# Patient Record
Sex: MALE | Race: WHITE | Employment: FULL TIME | URBAN - METROPOLITAN AREA
[De-identification: names, ages, dates, MRNs, and addresses within clinical notes are randomized per-mention and may not be internally consistent; named-entity substitution may affect disease eponyms.]

---

## 2017-02-20 ENCOUNTER — OFFICE VISIT (OUTPATIENT)
Dept: INTERNAL MEDICINE | Facility: CLINIC | Age: 42
End: 2017-02-20
Payer: COMMERCIAL

## 2017-02-20 VITALS
SYSTOLIC BLOOD PRESSURE: 118 MMHG | TEMPERATURE: 98 F | HEIGHT: 72 IN | DIASTOLIC BLOOD PRESSURE: 76 MMHG | HEART RATE: 73 BPM | WEIGHT: 209.4 LBS | BODY MASS INDEX: 28.36 KG/M2

## 2017-02-20 DIAGNOSIS — R10.9 FLANK PAIN: ICD-10-CM

## 2017-02-20 DIAGNOSIS — M54.5 LOW BACK PAIN, UNSPECIFIED BACK PAIN LATERALITY, UNSPECIFIED CHRONICITY, WITH SCIATICA PRESENCE UNSPECIFIED: Primary | ICD-10-CM

## 2017-02-20 PROCEDURE — 99214 OFFICE O/P EST MOD 30 MIN: CPT | Performed by: NURSE PRACTITIONER

## 2017-02-20 NOTE — PROGRESS NOTES
SUBJECTIVE:                                                    Reinier Monge is a 41 year old male who presents to clinic today for the following health issues:      Abdominal Pain      Duration: 1 month    Description (location/character/radiation): L side cramping pain, below rib cage       Associated flank pain: None    Intensity:  mild    Accompanying signs and symptoms:        Fever/Chills: no        Gas/Bloating: no        Nausea/vomitting: no        Diarrhea: no        Dysuria or Hematuria: no     History (previous similar pain/trauma/previous testing): none    Precipitating or alleviating factors:       Pain worse with eating/BM/urination: no       Pain relieved by BM: no, has mild constipation     Therapies tried and outcome: None    LMP:  not applicable     Back Pain      Duration: 1 year        Specific cause: none    Description:   Location of pain: low back bilateral  Character of pain: dull ache, stiffness and waxing and waning  Pain radiation:none  New numbness or weakness in legs, not attributed to pain:  no     Intensity: mild    History:   Pain interferes with job: No. Has long hours sitting at computer  History of back problems: no prior back problems  Any previous MRI or X-rays: None  Sees a specialist for back pain:  No  Therapies tried without relief: none    Alleviating factors:   Improved by: rest and sitting      Precipitating factors:  Worsened by: Standing    Functional and Psychosocial Screen (Jeaneth STarT Back):      Not performed today       Accompanying Signs & Symptoms:  Risk of Fracture:  None  Risk of Cauda Equina:  None  Risk of Infection:  None  Risk of Cancer:  None  Risk of Ankylosing Spondylitis:  Onset at age <35, male, AND morning back stiffness. no                          Patient Active Problem List   Diagnosis     Acute gastritis     Generalized anxiety disorder     Intermittent asthma     CARDIOVASCULAR SCREENING; LDL GOAL LESS THAN 160     Lateral epicondylitis  of right elbow     Past Surgical History   Procedure Laterality Date     C nonspecific procedure Right ~     Meniscus tear       Social History   Substance Use Topics     Smoking status: Never Smoker     Smokeless tobacco: Never Used     Alcohol use No     Family History   Problem Relation Age of Onset     Psychotic Disorder Mother      Born 1951, Anxiety     Family History Negative Father       age 50, from alcholism and smoking and cancer hx      Family History Negative Brother      Born      CANCER Other      nephew testical cancer     Cancer - colorectal No family hx of      Prostate Cancer No family hx of          Current Outpatient Prescriptions   Medication Sig Dispense Refill     LORazepam (ATIVAN) 0.5 MG tablet Take 2 tablets (1 mg) by mouth every 8 hours as needed for anxiety 40 tablet 0     albuterol (PROAIR HFA, PROVENTIL HFA, VENTOLIN HFA) 108 (90 BASE) MCG/ACT inhaler Inhale 2 puffs into the lungs every 4 hours as needed for shortness of breath / dyspnea or wheezing 3 Inhaler 3     IBUPROFEN PO        piroxicam (FELDENE) 20 MG capsule Take 1 capsule (20 mg) by mouth daily (with breakfast) (Patient not taking: Reported on 2017) 30 capsule 1     ORDER FOR DME Equipment being ordered: wrist splint, right (Patient not taking: Reported on 2017) 1 Device 0     BP Readings from Last 3 Encounters:   17 118/76   05/10/16 112/70   08/25/15 116/86    Wt Readings from Last 3 Encounters:   17 209 lb 6.4 oz (95 kg)   16 209 lb (94.8 kg)   05/10/16 209 lb 6.4 oz (95 kg)                    ROS:  Constitutional, HEENT, cardiovascular, pulmonary, gi and gu systems are negative, except as otherwise noted.    OBJECTIVE:                                                    /76 (BP Location: Right arm, Cuff Size: Adult Large)  Pulse 73  Temp 98  F (36.7  C) (Oral)  Ht 6' (1.829 m)  Wt 209 lb 6.4 oz (95 kg)  BMI 28.4 kg/m2  Body mass index is 28.4 kg/(m^2).  GENERAL: healthy, alert  and no distress  RESP: lungs clear to auscultation - no rales, rhonchi or wheezes  CV: regular rate and rhythm, normal S1 S2, no S3 or S4, no murmur, click or rub, no peripheral edema and peripheral pulses strong  ABDOMEN: soft, nontender, no hepatosplenomegaly, no masses and bowel sounds normal  MS: no gross musculoskeletal defects noted, no edema  BACK: no CVA tenderness, there is paralumbar tenderness and muscle knots         ASSESSMENT/PLAN:                                                              ICD-10-CM    1. Low back pain, unspecified back pain laterality, unspecified chronicity, with sciatica presence unspecified M54.5 [facility] PT, HAND, AND CHIROPRACTIC REFERRAL   2. Flank pain R10.9        Patient Instructions   Physical therapy  NSAIDS  miralax daily  Annamarie Wolf, [place]  [facility]

## 2017-02-20 NOTE — MR AVS SNAPSHOT
After Visit Summary   2/20/2017    Reinier Monge    MRN: [id number]           Patient Information     Date Of Birth          1975        Visit Information        Provider Department      2/20/2017 5:00 PM Annamarie Wolf NP [facility]        Today's Diagnoses     Low back pain, unspecified back pain laterality, unspecified chronicity, with sciatica presence unspecified    -  1    Flank pain          Care Instructions    Physical therapy  NSAIDS  miralax daily  Annamarie Wolf CNP          Follow-ups after your visit        Additional Services     [facility] PT, HAND, AND CHIROPRACTIC REFERRAL       **This order will print in the [facility] Scheduling Office**    Physical Therapy, Hand Therapy and Chiropractic Care are available through:    *[facility]  *[facility]  *[facility]    Call one number to schedule at any of the above locations: [phone].    Your provider has referred you to: As Indicated: [facility]    Indication/Reason for Referral: Low Back Pain  Onset of Illness: 1 year  Therapy Orders: Evaluate and Treat  Special Programs:   Special Request:     Jeaneth Eric      Additional Comments for the Therapist or Chiropractor:     Please be aware that coverage of these services is subject to the terms and limitations of your health insurance plan.  Call member services at your health plan with any benefit or coverage questions.      Please bring the following to your appointment:    *Your personal calendar for scheduling future appointments  *Comfortable clothing                  Who to contact     If you have questions or need follow up information about today's clinic visit or your schedule please contact [facility] directly at [phone].  Normal or non-critical lab and imaging results will be communicated to you by MyChart, letter or phone within 4 business days after the clinic has received the  results. If you do not hear from us within 7 days, please contact the clinic through [employer] or phone. If you have a critical or abnormal lab result, we will notify you by phone as soon as possible.  Submit refill requests through [employer] or call your pharmacy and they will forward the refill request to us. Please allow 3 business days for your refill to be completed.          Additional Information About Your Visit        [employer] Information     [employer] gives you secure access to your electronic health record. If you see a primary care provider, you can also send messages to your care team and make appointments. If you have questions, please call your primary care clinic.  If you do not have a primary care provider, please call [phone] and they will assist you.        Care EveryWhere ID     This is your Care EveryWhere ID. This could be used by other organizations to access your [facility] medical records  [id number]        Your Vitals Were     Pulse Temperature Height BMI (Body Mass Index)          73 98  F (36.7  C) (Oral) 6' (1.829 m) 28.4 kg/m2         Blood Pressure from Last 3 Encounters:   02/20/17 118/76   05/10/16 112/70   08/25/15 116/86    Weight from Last 3 Encounters:   02/20/17 209 lb 6.4 oz (95 kg)   05/27/16 209 lb (94.8 kg)   05/10/16 209 lb 6.4 oz (95 kg)              We Performed the Following     [facility] PT, HAND, AND CHIROPRACTIC REFERRAL        Primary Care Provider Office Phone # Fax #    Charlie Thacker [phone]       [facility] [address] [place] [address]        Thank you!     Thank you for choosing [facility]  for your care. Our goal is always to provide you with excellent care. Hearing back from our patients is one way we can continue to improve our services. Please take a few minutes to complete the written survey that you may receive in the mail after your visit with us. Thank you!             Your Updated Medication  List - Protect others around you: Learn how to safely use, store and throw away your medicines at www.disposemymeds.org.          This list is accurate as of: 2/20/17  5:26 PM.  Always use your most recent med list.                   Brand Name Dispense Instructions for use    albuterol 108 (90 BASE) MCG/ACT Inhaler    PROAIR HFA/PROVENTIL HFA/VENTOLIN HFA    3 Inhaler    Inhale 2 puffs into the lungs every 4 hours as needed for shortness of breath / dyspnea or wheezing       IBUPROFEN PO          LORazepam 0.5 MG tablet    ATIVAN    40 tablet    Take 2 tablets (1 mg) by mouth every 8 hours as needed for anxiety       order for DME     1 Device    Equipment being ordered: wrist splint, right       piroxicam 20 MG capsule    FELDENE    30 capsule    Take 1 capsule (20 mg) by mouth daily (with breakfast)

## 2017-02-21 ASSESSMENT — ASTHMA QUESTIONNAIRES: ACT_TOTALSCORE: 22

## 2017-03-13 ENCOUNTER — THERAPY VISIT (OUTPATIENT)
Dept: PHYSICAL THERAPY | Facility: CLINIC | Age: 42
End: 2017-03-13
Payer: COMMERCIAL

## 2017-03-13 DIAGNOSIS — M54.50 CHRONIC BILATERAL LOW BACK PAIN WITHOUT SCIATICA: Primary | ICD-10-CM

## 2017-03-13 DIAGNOSIS — G89.29 CHRONIC BILATERAL LOW BACK PAIN WITHOUT SCIATICA: Primary | ICD-10-CM

## 2017-03-13 PROCEDURE — 97161 PT EVAL LOW COMPLEX 20 MIN: CPT | Mod: GP | Performed by: PHYSICAL THERAPIST

## 2017-03-13 PROCEDURE — 97112 NEUROMUSCULAR REEDUCATION: CPT | Mod: GP | Performed by: PHYSICAL THERAPIST

## 2017-03-13 NOTE — PROGRESS NOTES
[facility] Therapy Initial Evaluation  3/13/2017     Subjective:   Chief Complaint: Intermittent midline low back pain that radiates out to the sides at time.  He denies numbness/tingling or pain spreading to either legs.   Patient's Goal(s): Wants to get back to playing with his son and play softball  Referral Date: 2/20/17  Mechanism of onset: Was standing for 1.5 hours 1 year ago at his son's baseball game when he experienced a low back spasm and could not bend over. Since then he's had episodic pain bilaterally along his lower back.  PMH/surgical history/trauma: none He denies any significant current illness or recent hospital admissions. He denies any regonal implanted devices.  Medications: Lorazepam prn  General health as reported by patient:  good.   Symptom Stability:  Stable/ Uncomplicated  Pain: 0/10 at rest; 6/10 worst  Quality of Pain: Dull with intermitent sharp pain  Better: Sitting down  Worse: Standing, laying down flat  Progression of Symptoms since onset:  Since onset, these symptoms are getting worse  Occupation: Design Job duties: Sitting  Sleeping: Waking 2-3 times per night due to back pain  Current Functional Status:  Standing for 10 minutes, unrestricted sitting with pain increasing over an hour; pain with lifting heavy weights, pain with sexual activity  Previous Functional Status: fully functional prior to pain onset/injury.  Current HEP/exercise regimen: None, Softball end of April 2 nights per week, volleyball  Transportation: Drives  Live with Others: Wife & son (7 years)  Red Flags: Patient denies the following: Pain with Cough / Sneeze / Laughing ; Night Pain ; Fever ; Weakness ; Change in Bowel or Bladder ; Unexplained Weight Loss ;   Patient reports the following: None       HPI  LUMBAR Examination:    Sitting posture: slouched  Standing posture: decreased lordosis  Lateral shift present: none   Posture correction: Painful with sitting erect    Gait:  decreased hip extension and lumbar extension    SL Balance: no trendelenburg pattern noted B    Functional:  - Squat: excessive anterior knee excursion, increased lumbar flexion     AROM: (Major, Moderate, Minimal or Nil loss)  Movement Loss % Loss Pain   Flexion Fingertips to floor Pain at end range   Extension 50% Pain at end range   Side Gliding/Bend L nil Pain on the right   Side Gliding/Bend R nil Pain on the left     Neurological:  Motor Deficit:  Myotomes L R   L1-2 (hip flexion) 5 5   L3 (knee extension) 5 5   L4 (ankle DF) 5 5   L5 (g. toe ext) 5 5   S1 (ankle PF or knee flex) 5 5     Reflexes: Achilles: R 2+, L 2+        Patellar R 2+, L 2+  Dural Signs:   L R   Slump - -   SLR - -     Strength/Motor Control:    - TA activation: fair with cueing   - Glute activation: delayed glute initiation with leg raise B (R>L). Isolated activation of right side with leg raise increased left LBP.    Lumbar Mobility/Spring Testing: Pain with spring testing over lower lumbar segments, increased sensitivity over spinous processes of all lumbar spine    HIP Screen: Decreased hip extension PROM bilaterally (R>L). All other PROM WNL and did not reproduce low back pain; some hip pain with IR and end range flexion movements.     System  Physical Exam  General   ROS    Assessment/Plan:      Patient is a 41 year old male with lumbar complaints.    Patient has the following significant findings with corresponding treatment plan.                Diagnosis 1:  Chronic bilateral low back pain  Pain -  hot/cold therapy, electric stimulation, manual therapy, self management, education, directional preference exercise and home program  Decreased ROM/flexibility - manual therapy and therapeutic exercise  Decreased joint mobility - manual therapy and therapeutic exercise  Decreased strength - therapeutic exercise and therapeutic activities  Impaired muscle performance - neuro re-education  Decreased function - therapeutic  activities  Impaired posture - neuro re-education    Therapy Evaluation Codes:   1) History comprised of:   Personal factors that impact the plan of care:      Time since onset of symptoms.    Comorbidity factors that impact the plan of care are:      Asthma and Pain at night/rest.     Medications impacting care: Lorazepam prn.  2) Examination of Body Systems comprised of:   Body structures and functions that impact the plan of care:      Lumbar spine.   Activity limitations that impact the plan of care are:      Bending, Driving, Sitting, Standing and Sleeping.  3) Clinical presentation characteristics are:   Stable/Uncomplicated.  4) Decision-Making    Low complexity using standardized patient assessment instrument and/or measureable assessment of functional outcome.  Cumulative Therapy Evaluation is: Low complexity.    Previous and current functional limitations:  (See Goal Flow Sheet for this information)    Short term and Long term goals: (See Goal Flow Sheet for this information)     Communication ability:  Patient appears to be able to clearly communicate and understand verbal and written communication and follow directions correctly.  Treatment Explanation - The following has been discussed with the patient:   RX ordered/plan of care  Anticipated outcomes  Possible risks and side effects  This patient would benefit from PT intervention to resume normal activities.   Rehab potential is good.    Frequency:  1 X week, once daily  Duration:  for 8 weeks  Discharge Plan:  Achieve all LTG.  Independent in home treatment program.  Reach maximal therapeutic benefit.    Please refer to the daily flowsheet for treatment today, total treatment time and time spent performing 1:1 timed codes.

## 2017-03-14 PROBLEM — G89.29 CHRONIC BILATERAL LOW BACK PAIN WITHOUT SCIATICA: Status: ACTIVE | Noted: 2017-03-14

## 2017-03-14 PROBLEM — M54.50 CHRONIC BILATERAL LOW BACK PAIN WITHOUT SCIATICA: Status: ACTIVE | Noted: 2017-03-14

## 2017-04-06 ENCOUNTER — OFFICE VISIT (OUTPATIENT)
Dept: INTERNAL MEDICINE | Facility: CLINIC | Age: 42
End: 2017-04-06
Payer: COMMERCIAL

## 2017-04-06 VITALS
OXYGEN SATURATION: 99 % | TEMPERATURE: 97.3 F | BODY MASS INDEX: 28.85 KG/M2 | HEART RATE: 70 BPM | SYSTOLIC BLOOD PRESSURE: 116 MMHG | DIASTOLIC BLOOD PRESSURE: 74 MMHG | HEIGHT: 72 IN | WEIGHT: 213 LBS

## 2017-04-06 DIAGNOSIS — R05.9 COUGH: ICD-10-CM

## 2017-04-06 DIAGNOSIS — R06.2 WHEEZING: ICD-10-CM

## 2017-04-06 DIAGNOSIS — J06.9 UPPER RESPIRATORY TRACT INFECTION, UNSPECIFIED TYPE: Primary | ICD-10-CM

## 2017-04-06 DIAGNOSIS — J45.20 INTERMITTENT ASTHMA, UNCOMPLICATED: ICD-10-CM

## 2017-04-06 PROCEDURE — 99213 OFFICE O/P EST LOW 20 MIN: CPT | Performed by: NURSE PRACTITIONER

## 2017-04-06 RX ORDER — CODEINE PHOSPHATE AND GUAIFENESIN 10; 100 MG/5ML; MG/5ML
2 SOLUTION ORAL EVERY 4 HOURS PRN
Qty: 240 ML | Refills: 1 | Status: SHIPPED | OUTPATIENT
Start: 2017-04-06 | End: 2017-08-08

## 2017-04-06 RX ORDER — AZITHROMYCIN 250 MG/1
TABLET, FILM COATED ORAL
Qty: 6 TABLET | Refills: 1 | Status: SHIPPED | OUTPATIENT
Start: 2017-04-06 | End: 2017-08-08

## 2017-04-06 NOTE — PROGRESS NOTES
.  SUBJECTIVE:                                                    Reinier Monge is a 41 year old male who presents to clinic today for the following health issues:  Pt has a dry cough onset x 4 days.  Has asthma ; using albuterol prn - increased use   No fever   Sore throats today   No ear pain   Some wheezing   Cough not productive     Wife recently diagnosed with bronchitis   Theraflu and nyquil have not helped much         Problem list and histories reviewed & adjusted, as indicated.  Additional history: as documented    Patient Active Problem List   Diagnosis     Acute gastritis     Generalized anxiety disorder     Intermittent asthma     CARDIOVASCULAR SCREENING; LDL GOAL LESS THAN 160     Lateral epicondylitis of right elbow     Chronic bilateral low back pain without sciatica     Past Surgical History:   Procedure Laterality Date     C NONSPECIFIC PROCEDURE Right ~2009    Meniscus tear       Social History   Substance Use Topics     Smoking status: Never Smoker     Smokeless tobacco: Never Used     Alcohol use No     Family History   Problem Relation Age of Onset     Psychotic Disorder Mother      Born [birth year], Anxiety     Family History Negative Father       age 50, from alcholism and smoking and cancer hx      Family History Negative Brother      Born      CANCER Other      nephew testical cancer     Cancer - colorectal No family hx of      Prostate Cancer No family hx of            Reviewed and updated as needed this visit by clinical staff  Tobacco  Allergies  Meds  Med Hx  Surg Hx  Fam Hx  Soc Hx      Reviewed and updated as needed this visit by Provider         ROS:  Constitutional, HEENT, cardiovascular, pulmonary, GI, , musculoskeletal, neuro, skin, endocrine and psych systems are negative, except as otherwise noted.    OBJECTIVE:                                                    /74 (BP Location: Left arm, Patient Position: Chair, Cuff Size: Adult Large)  Pulse 70  Temp  97.3  F (36.3  C) (Oral)  Ht 6' (1.829 m)  Wt 213 lb (96.6 kg)  SpO2 99%  BMI 28.89 kg/m2  Body mass index is 28.89 kg/(m^2).  GENERAL:  alert and no distress  HENT: ear canals and TM's dull, nose and mouth without ulcers or lesions  RESP: lungs clear to auscultation - no rales, rhonchi or wheezes; with forced exhalation wheezes present ; worse left than right side   CV: regular rate and rhythm  ABDOMEN: soft, nontender, no hepatosplenomegaly, no masses and bowel sounds normal  MS: no gross musculoskeletal defects noted, no edema  NEURO: Normal strength and tone, mentation intact and speech normal  PSYCH: mentation appears normal, affect normal/bright    Diagnostic Test Results:  none      ASSESSMENT/PLAN:                                                            (J06.9) Upper respiratory tract infection, unspecified type  (primary encounter diagnosis)  Comment:   Plan: azithromycin (ZITHROMAX) 250 MG tablet            (R05) Cough  Comment:   Plan: azithromycin (ZITHROMAX) 250 MG tablet,         guaiFENesin-codeine (ROBITUSSIN AC) 100-10         MG/5ML SOLN solution            (R06.2) Wheezing  Comment: occasional   Plan: albuterol prn     (J45.20) Intermittent asthma, uncomplicated  Comment: wife is significantly ill with wheezes and on antibiotics   Plan: will treat for illness     Patient Instructions   Will treat with Zithromax 250mg, 2 tabs by mouth day 1, then 1 tab by mouth days 2-5. He is to watch for GI upset, diarrhea or rash.    Cough syrup with codeine as needed for cough      Please, call or return to clinic if signs or symptoms worsen or fail to improve as anticipated        RAGHU Wilcox [facility]

## 2017-04-06 NOTE — PATIENT INSTRUCTIONS
Will treat with Zithromax 250mg, 2 tabs by mouth day 1, then 1 tab by mouth days 2-5. He is to watch for GI upset, diarrhea or rash.    Cough syrup with codeine as needed for cough      Please, call or return to clinic if signs or symptoms worsen or fail to improve as anticipated

## 2017-04-06 NOTE — MR AVS SNAPSHOT
After Visit Summary   4/6/2017    Reinier Monge    MRN: [id number]           Patient Information     Date Of Birth          1975        Visit Information        Provider Department      4/6/2017 8:40 AM Ava Zelaya APRN CNP [facility]        Today's Diagnoses     Upper respiratory tract infection, unspecified type    -  1    Cough        Wheezing        Intermittent asthma, uncomplicated          Care Instructions    Will treat with Zithromax 250mg, 2 tabs by mouth day 1, then 1 tab by mouth days 2-5. He is to watch for GI upset, diarrhea or rash.    Cough syrup with codeine as needed for cough      Please, call or return to clinic if signs or symptoms worsen or fail to improve as anticipated          Follow-ups after your visit        Who to contact     If you have questions or need follow up information about today's clinic visit or your schedule please contact [facility] directly at [phone].  Normal or non-critical lab and imaging results will be communicated to you by MyChart, letter or phone within 4 business days after the clinic has received the results. If you do not hear from us within 7 days, please contact the clinic through [employer] or phone. If you have a critical or abnormal lab result, we will notify you by phone as soon as possible.  Submit refill requests through [employer] or call your pharmacy and they will forward the refill request to us. Please allow 3 business days for your refill to be completed.          Additional Information About Your Visit        MyChart Information     [employer] gives you secure access to your electronic health record. If you see a primary care provider, you can also send messages to your care team and make appointments. If you have questions, please call your primary care clinic.  If you do not have a primary care provider, please call [phone] and they will assist you.        Care EveryWhere ID      This is your Care EveryWhere ID. This could be used by other organizations to access your [place] medical records  [id number]        Your Vitals Were     Pulse Temperature Height Pulse Oximetry BMI (Body Mass Index)       70 97.3  F (36.3  C) (Oral) 6' (1.829 m) 99% 28.89 kg/m2        Blood Pressure from Last 3 Encounters:   04/06/17 116/74   02/20/17 118/76   05/10/16 112/70    Weight from Last 3 Encounters:   04/06/17 213 lb (96.6 kg)   02/20/17 209 lb 6.4 oz (95 kg)   05/27/16 209 lb (94.8 kg)              Today, you had the following     No orders found for display         Today's Medication Changes          These changes are accurate as of: 4/6/17  9:06 AM.  If you have any questions, ask your nurse or doctor.               Start taking these medicines.        Dose/Directions    azithromycin 250 MG tablet   Commonly known as:  ZITHROMAX   Used for:  Upper respiratory tract infection, unspecified type, Cough   Started by:  Ava Zelaya APRN CNP        Two tablets first day, then one tablet daily for four days.   Quantity:  6 tablet   Refills:  1       guaiFENesin-codeine 100-10 MG/5ML Soln solution   Commonly known as:  ROBITUSSIN AC   Used for:  Cough   Started by:  Ava Zelaya APRN CNP        Dose:  2 tsp.   Take 10 mLs by mouth every 4 hours as needed   Quantity:  240 mL   Refills:  1            Where to get your medicines      These medications were sent to [id number] [address] [place] [address] [address] [place] [address]. , [facility] [address]     Phone:  [phone]     azithromycin 250 MG tablet         Some of these will need a paper prescription and others can be bought over the counter.  Ask your nurse if you have questions.     Bring a paper prescription for each of these medications     guaiFENesin-codeine 100-10 MG/5ML Soln solution                Primary Care Provider Office Phone # Fax #    Charlie Thacker [phone]       [facility]  CLINIC [address] [facility] [address]  [facility] [address]        Thank you!     Thank you for choosing [facility]  for your care. Our goal is always to provide you with excellent care. Hearing back from our patients is one way we can continue to improve our services. Please take a few minutes to complete the written survey that you may receive in the mail after your visit with us. Thank you!             Your Updated Medication List - Protect others around you: Learn how to safely use, store and throw away your medicines at www.disposemymeds.org.          This list is accurate as of: 4/6/17  9:06 AM.  Always use your most recent med list.                   Brand Name Dispense Instructions for use    albuterol 108 (90 BASE) MCG/ACT Inhaler    PROAIR HFA/PROVENTIL HFA/VENTOLIN HFA    3 Inhaler    Inhale 2 puffs into the lungs every 4 hours as needed for shortness of breath / dyspnea or wheezing       azithromycin 250 MG tablet    ZITHROMAX    6 tablet    Two tablets first day, then one tablet daily for four days.       guaiFENesin-codeine 100-10 MG/5ML Soln solution    ROBITUSSIN AC    240 mL    Take 10 mLs by mouth every 4 hours as needed       IBUPROFEN PO          LORazepam 0.5 MG tablet    ATIVAN    40 tablet    Take 2 tablets (1 mg) by mouth every 8 hours as needed for anxiety

## 2017-04-06 NOTE — NURSING NOTE
Chief Complaint   Patient presents with     Cough     pt c/o a  dry cough  onset x 4 days.       Initial /74 (BP Location: Left arm, Patient Position: Chair, Cuff Size: Adult Large)  Pulse 70  Temp 97.3  F (36.3  C) (Oral)  Ht 6' (1.829 m)  Wt 213 lb (96.6 kg)  SpO2 99%  BMI 28.89 kg/m2 Estimated body mass index is 28.89 kg/(m^2) as calculated from the following:    Height as of this encounter: 6' (1.829 m).    Weight as of this encounter: 213 lb (96.6 kg).  Medication Reconciliation: complete

## 2017-04-14 ENCOUNTER — MYC MEDICAL ADVICE (OUTPATIENT)
Dept: INTERNAL MEDICINE | Facility: CLINIC | Age: 42
End: 2017-04-14

## 2017-04-14 DIAGNOSIS — R05.9 COUGH: Primary | ICD-10-CM

## 2017-04-17 RX ORDER — BENZONATATE 200 MG/1
200 CAPSULE ORAL 3 TIMES DAILY PRN
Qty: 21 CAPSULE | Refills: 0 | Status: SHIPPED | OUTPATIENT
Start: 2017-04-17 | End: 2017-08-08

## 2017-07-24 DIAGNOSIS — F41.1 GENERALIZED ANXIETY DISORDER: ICD-10-CM

## 2017-07-24 NOTE — TELEPHONE ENCOUNTER
Lorazepam      Last Written Prescription Date:  08/25/16     (SAYS PATIENT NOT TAKING???)  Last Fill Quantity: 40,   # refills: 0  Last Office Visit with [facility], [facility] or [facility] prescribing provider: 04/16/17 Tyrell  Future Office visit:       Routing refill request to provider for review/approval because:  Drug not on the [facility], [facility] or [facility] refill protocol or controlled substance

## 2017-07-25 RX ORDER — LORAZEPAM 0.5 MG/1
1 TABLET ORAL EVERY 8 HOURS PRN
Qty: 40 TABLET | Refills: 0 | Status: SHIPPED | OUTPATIENT
Start: 2017-07-25 | End: 2017-11-02

## 2017-07-27 PROBLEM — G89.29 CHRONIC BILATERAL LOW BACK PAIN WITHOUT SCIATICA: Status: RESOLVED | Noted: 2017-03-14 | Resolved: 2017-07-27

## 2017-07-27 PROBLEM — M54.50 CHRONIC BILATERAL LOW BACK PAIN WITHOUT SCIATICA: Status: RESOLVED | Noted: 2017-03-14 | Resolved: 2017-07-27

## 2017-07-27 NOTE — PROGRESS NOTES
Please see initial evaluation for discharge summary as patient only attended physical therapy one time.

## 2017-08-08 ENCOUNTER — OFFICE VISIT (OUTPATIENT)
Dept: INTERNAL MEDICINE | Facility: CLINIC | Age: 42
End: 2017-08-08
Payer: COMMERCIAL

## 2017-08-08 VITALS
SYSTOLIC BLOOD PRESSURE: 110 MMHG | DIASTOLIC BLOOD PRESSURE: 68 MMHG | WEIGHT: 203 LBS | TEMPERATURE: 98.3 F | OXYGEN SATURATION: 97 % | RESPIRATION RATE: 14 BRPM | HEART RATE: 79 BPM | HEIGHT: 72 IN | BODY MASS INDEX: 27.5 KG/M2

## 2017-08-08 DIAGNOSIS — Z23 NEED FOR TETANUS BOOSTER: ICD-10-CM

## 2017-08-08 DIAGNOSIS — J45.20 INTERMITTENT ASTHMA, UNCOMPLICATED: ICD-10-CM

## 2017-08-08 DIAGNOSIS — F41.9 ANXIETY: Primary | ICD-10-CM

## 2017-08-08 PROCEDURE — 90714 TD VACC NO PRESV 7 YRS+ IM: CPT | Performed by: NURSE PRACTITIONER

## 2017-08-08 PROCEDURE — 99213 OFFICE O/P EST LOW 20 MIN: CPT | Performed by: NURSE PRACTITIONER

## 2017-08-08 RX ORDER — BUPROPION HYDROCHLORIDE 150 MG/1
150 TABLET ORAL EVERY MORNING
Qty: 30 TABLET | Refills: 1 | Status: SHIPPED | OUTPATIENT
Start: 2017-08-08 | End: 2018-04-17

## 2017-08-08 ASSESSMENT — ANXIETY QUESTIONNAIRES
6. BECOMING EASILY ANNOYED OR IRRITABLE: MORE THAN HALF THE DAYS
3. WORRYING TOO MUCH ABOUT DIFFERENT THINGS: NOT AT ALL
5. BEING SO RESTLESS THAT IT IS HARD TO SIT STILL: NOT AT ALL
GAD7 TOTAL SCORE: 9
1. FEELING NERVOUS, ANXIOUS, OR ON EDGE: NEARLY EVERY DAY
7. FEELING AFRAID AS IF SOMETHING AWFUL MIGHT HAPPEN: NOT AT ALL
IF YOU CHECKED OFF ANY PROBLEMS ON THIS QUESTIONNAIRE, HOW DIFFICULT HAVE THESE PROBLEMS MADE IT FOR YOU TO DO YOUR WORK, TAKE CARE OF THINGS AT HOME, OR GET ALONG WITH OTHER PEOPLE: NOT DIFFICULT AT ALL
2. NOT BEING ABLE TO STOP OR CONTROL WORRYING: NEARLY EVERY DAY

## 2017-08-08 ASSESSMENT — PATIENT HEALTH QUESTIONNAIRE - PHQ9: 5. POOR APPETITE OR OVEREATING: SEVERAL DAYS

## 2017-08-08 NOTE — PROGRESS NOTES
SUBJECTIVE:                                                    Reinier Monge is a 41 year old male who presents to clinic today for the following health issues:  Anxiety   No out - when driving there is not an exit it causing him more anxiety   Sitting on edge of seats in movie theater so he can escape     Has used lorazepam 2 pills at a time - takes one and takes another in an hour      Tried   buspar - terrible   celexa   Sertraline   effexor     Has tried pills in past and seemed to heighten it     Discussed and will try Wellbutrin     Tetanus shot          Problem list and histories reviewed & adjusted, as indicated.  Additional history: as documented    Patient Active Problem List   Diagnosis     Acute gastritis     Generalized anxiety disorder     Intermittent asthma     CARDIOVASCULAR SCREENING; LDL GOAL LESS THAN 160     Lateral epicondylitis of right elbow     Past Surgical History:   Procedure Laterality Date     C NONSPECIFIC PROCEDURE Right ~2009    Meniscus tear       Social History   Substance Use Topics     Smoking status: Never Smoker     Smokeless tobacco: Never Used     Alcohol use No     Family History   Problem Relation Age of Onset     Psychotic Disorder Mother      Born , Anxiety     Family History Negative Father       age 50, from alcholism and smoking and cancer hx      Family History Negative Brother      Born      CANCER Other      nephew testical cancer     Cancer - colorectal No family hx of      Prostate Cancer No family hx of              Reviewed and updated as needed this visit by clinical staffTobacco  Allergies  Meds  Med Hx  Surg Hx  Fam Hx  Soc Hx      Reviewed and updated as needed this visit by Provider         ROS:  Constitutional, HEENT, cardiovascular, pulmonary, GI, , musculoskeletal, neuro, skin, endocrine and psych systems are negative, except as otherwise noted.      OBJECTIVE:   /68  Pulse 79  Temp 98.3  F (36.8  C) (Oral)  Resp 14  " Ht 5' 11.75\" (1.822 m)  Wt 203 lb (92.1 kg)  SpO2 97%  BMI 27.72 kg/m2  Body mass index is 27.72 kg/(m^2).  GENERAL: alert and no distress; here with son   RESP: lungs clear to auscultation - no rales, rhonchi or wheezes  CV: regular rate and rhythm, normal S1 S2, no S3 or S4, no murmur, click or rub, no peripheral edema and peripheral pulses strong  PSYCH: mentation appears normal, affect normal/bright    Diagnostic Test Results:  MARCELO-7 SCORE 12/12/2011 9/13/2012 9/19/2014   Total Score 12 1 0           ASSESSMENT/PLAN:             1. Anxiety  Not well controlled - ativan does work well if needed   - buPROPion (WELLBUTRIN XL) 150 MG 24 hr tablet; Take 1 tablet (150 mg) by mouth every morning  Dispense: 30 tablet; Refill: 1    2. Intermittent asthma, uncomplicated  Well controlled   - Asthma Action Plan (AAP)    3. Need for tetanus booster    - TD PRESERV FREE >=7 YRS ADS IM    Patient Instructions   Wellbutrin  mg once daily     Ativan as needed for anxiety attack     Follow up in a month       RAGHU Wilcox [facility]     "

## 2017-08-08 NOTE — MR AVS SNAPSHOT
After Visit Summary   8/8/2017    Reinier Monge    MRN: [id number]           Patient Information     Date Of Birth          1975        Visit Information        Provider Department      8/8/2017 4:00 PM Ava Zelaya APRN CNP [facility]        Today's Diagnoses     Anxiety    -  1    Intermittent asthma, uncomplicated        Need for tetanus booster          Care Instructions    Wellbutrin  mg once daily     Ativan as needed for anxiety attack     Follow up in a month           Follow-ups after your visit        Who to contact     If you have questions or need follow up information about today's clinic visit or your schedule please contact [facility] directly at [phone].  Normal or non-critical lab and imaging results will be communicated to you by MyChart, letter or phone within 4 business days after the clinic has received the results. If you do not hear from us within 7 days, please contact the clinic through [employer] or phone. If you have a critical or abnormal lab result, we will notify you by phone as soon as possible.  Submit refill requests through [employer] or call your pharmacy and they will forward the refill request to us. Please allow 3 business days for your refill to be completed.          Additional Information About Your Visit        MyChart Information     [employer] gives you secure access to your electronic health record. If you see a primary care provider, you can also send messages to your care team and make appointments. If you have questions, please call your primary care clinic.  If you do not have a primary care provider, please call [phone] and they will assist you.        Care EveryWhere ID     This is your Care EveryWhere ID. This could be used by other organizations to access your [place] medical records  [id number]         Blood Pressure from Last 3 Encounters:   04/06/17 116/74   02/20/17 118/76    05/10/16 112/70    Weight from Last 3 Encounters:   04/06/17 213 lb (96.6 kg)   02/20/17 209 lb 6.4 oz (95 kg)   05/27/16 209 lb (94.8 kg)              We Performed the Following     Asthma Action Plan (AAP)     TD PRESERV FREE >=7 YRS ADS IM          Today's Medication Changes          These changes are accurate as of: 8/8/17  4:37 PM.  If you have any questions, ask your nurse or doctor.               Start taking these medicines.        Dose/Directions    buPROPion 150 MG 24 hr tablet   Commonly known as:  WELLBUTRIN XL   Used for:  Anxiety   Started by:  Ava Zelaya APRN CNP        Dose:  150 mg   Take 1 tablet (150 mg) by mouth every morning   Quantity:  30 tablet   Refills:  1         Stop taking these medicines if you haven't already. Please contact your care team if you have questions.     azithromycin 250 MG tablet   Commonly known as:  ZITHROMAX   Stopped by:  Ava Zelaya APRN CNP           benzonatate 200 MG capsule   Commonly known as:  TESSALON   Stopped by:  Ava Zelaya APRN CNP           guaiFENesin-codeine 100-10 MG/5ML Soln solution   Commonly known as:  ROBITUSSIN AC   Stopped by:  Ava Zelaya APRN CNP                Where to get your medicines      These medications were sent to [facility][id number] - [place], [place] - [address]  [address], [facility] [address]     Phone:  [phone]     buPROPion 150 MG 24 hr tablet                Primary Care Provider Office Phone # Fax #    Charlie Thacker [phone]       [address] [facility] [address]  [facility] [address]        Equal Access to Services     [facility]: Hadii alpa Dominguez, pat lupb, qaybta kaalmacecile frausto. So [facility] [phone].    ATENCIÓN: Si habla español, tiene a quiroz disposición servicios gratuitos de asistencia lingüística. Llame al [phone].    We comply with applicable federal civil rights laws and  [place] laws. We do not discriminate on the basis of race, color, national origin, age, disability sex, sexual orientation or gender identity.            Thank you!     Thank you for choosing [facility]  for your care. Our goal is always to provide you with excellent care. Hearing back from our patients is one way we can continue to improve our services. Please take a few minutes to complete the written survey that you may receive in the mail after your visit with us. Thank you!             Your Updated Medication List - Protect others around you: Learn how to safely use, store and throw away your medicines at www.disposemymeds.org.          This list is accurate as of: 8/8/17  4:37 PM.  Always use your most recent med list.                   Brand Name Dispense Instructions for use Diagnosis    albuterol 108 (90 BASE) MCG/ACT Inhaler    PROAIR HFA/PROVENTIL HFA/VENTOLIN HFA    3 Inhaler    Inhale 2 puffs into the lungs every 4 hours as needed for shortness of breath / dyspnea or wheezing    Intermittent asthma, uncomplicated       buPROPion 150 MG 24 hr tablet    WELLBUTRIN XL    30 tablet    Take 1 tablet (150 mg) by mouth every morning    Anxiety       IBUPROFEN PO           LORazepam 0.5 MG tablet    ATIVAN    40 tablet    Take 2 tablets (1 mg) by mouth every 8 hours as needed for anxiety    Generalized anxiety disorder

## 2017-08-08 NOTE — NURSING NOTE
Chief Complaint   Patient presents with     Anxiety       Initial There were no vitals taken for this visit. Estimated body mass index is 28.89 kg/(m^2) as calculated from the following:    Height as of 4/6/17: 6' (1.829 m).    Weight as of 4/6/17: 213 lb (96.6 kg).  Medication Reconciliation: complete

## 2017-08-08 NOTE — LETTER
My Asthma Action Plan  Name: Reinier Monge   YOB: 1975  Date: 8/8/2017   My doctor: RAGHU Wilcox CNP   My clinic: [facility]        My Control Medicine:   My Rescue Medicine: albuterol   My Asthma Severity: mild  Avoid your asthma triggers:                GREEN ZONE   Good Control    I feel good    No cough or wheeze    Can work, sleep and play without asthma symptoms       Take your asthma control medicine every day.     1. If exercise triggers your asthma, take your rescue medication    15 minutes before exercise or sports, and    During exercise if you have asthma symptoms  2. Spacer to use with inhaler: If you have a spacer, make sure to use it with your inhaler             YELLOW ZONE Getting Worse  I have ANY of these:    I do not feel good    Cough or wheeze    Chest feels tight    Wake up at night   1. Keep taking your Green Zone medications  2. Start taking your rescue medicine:    every 20 minutes for up to 1 hour. Then every 4 hours for 24-48 hours.  3. If you stay in the Yellow Zone for more than 12-24 hours, contact your doctor.  4. If you do not return to the Green Zone in 12-24 hours or you get worse, start taking your oral steroid medicine if prescribed by your provider.           RED ZONE Medical Alert - Get Help  I have ANY of these:    I feel awful    Medicine is not helping    Breathing getting harder    Trouble walking or talking    Nose opens wide to breathe       1. Take your rescue medicine NOW  2. If your provider has prescribed an oral steroid medicine, start taking it NOW  3. Call your doctor NOW  4. If you are still in the Red Zone after 20 minutes and you have not reached your doctor:    Take your rescue medicine again and    Call 911 or go to the emergency room right away    See your regular doctor within 2 weeks of an Emergency Room or Urgent Care visit for follow-up treatment.        Electronically signed by: Ava Zelaya  August 8, 2017    Annual Reminders:  Meet with Asthma Educator,  Flu Shot in the Fall, consider Pneumonia Vaccination for patients with asthma (aged 19 and older).    Pharmacy: [id number] [address] [facility] [address]                    Asthma Triggers  How To Control Things That Make Your Asthma Worse    Triggers are things that make your asthma worse.  Look at the list below to help you find your triggers and what you can do about them.  You can help prevent asthma flare-ups by staying away from your triggers.      Trigger                                                          What you can do   Cigarette Smoke  Tobacco smoke can make asthma worse. Do not allow smoking in your home, car or around you.  Be sure no one smokes at a child s day care or school.  If you smoke, ask your health care provider for ways to help you quit.  Ask family members to quit too.  Ask your health care provider for a referral to Quit Plan to help you quit smoking, or call 3-617-716-PLAN.     Colds, Flu, Bronchitis  These are common triggers of asthma. Wash your hands often.  Don t touch your eyes, nose or mouth.  Get a flu shot every year.     Dust Mites  These are tiny bugs that live in cloth or carpet. They are too small to see. Wash sheets and blankets in hot water every week.   Encase pillows and mattress in dust mite proof covers.  Avoid having carpet if you can. If you have carpet, vacuum weekly.   Use a dust mask and HEPA vacuum.   Pollen and Outdoor Mold  Some people are allergic to trees, grass, or weed pollen, or molds. Try to keep your windows closed.  Limit time out doors when pollen count is high.   Ask you health care provider about taking medicine during allergy season.     Animal Dander  Some people are allergic to skin flakes, urine or saliva from pets with fur or feathers. Keep pets with fur or feathers out of your home.    If you can t keep the pet outdoors, then keep the pet out of your bedroom.   Keep the bedroom door closed.  Keep pets off cloth furniture and away from stuffed toys.     Mice, Rats, and Cockroaches  Some people are allergic to the waste from these pests.   Cover food and garbage.  Clean up spills and food crumbs.  Store grease in the refrigerator.   Keep food out of the bedroom.   Indoor Mold  This can be a trigger if your home has high moisture. Fix leaking faucets, pipes, or other sources of water.   Clean moldy surfaces.  Dehumidify basement if it is damp and smelly.   Smoke, Strong Odors, and Sprays  These can reduce air quality. Stay away from strong odors and sprays, such as perfume, powder, hair spray, paints, smoke incense, paint, cleaning products, candles and new carpet.   Exercise or Sports  Some people with asthma have this trigger. Be active!  Ask your doctor about taking medicine before sports or exercise to prevent symptoms.    Warm up for 5-10 minutes before and after sports or exercise.     Other Triggers of Asthma  Cold air:  Cover your nose and mouth with a scarf.  Sometimes laughing or crying can be a trigger.  Some medicines and food can trigger asthma.

## 2017-08-09 ASSESSMENT — ANXIETY QUESTIONNAIRES: GAD7 TOTAL SCORE: 9

## 2017-08-09 ASSESSMENT — ASTHMA QUESTIONNAIRES: ACT_TOTALSCORE: 23

## 2017-10-05 DIAGNOSIS — J45.20 INTERMITTENT ASTHMA, UNCOMPLICATED: ICD-10-CM

## 2017-10-05 NOTE — TELEPHONE ENCOUNTER
albuterol (PROAIR HFA, PROVENTIL HFA, VENTOLIN HFA) 108 (90 BASE) MCG/ACT inhaler       Last Written Prescription Date: 05/10/16  Last Fill Quantity: 3inhalers, # refills: 3   Last Office Visit with G, P or [facility] prescribing provider:  08/08/17   Future Office Visit:       Date of Last Asthma Action Plan Letter:   Asthma Action Plan Q1 Year    Topic Date Due     Asthma Action Plan - yearly  08/08/2018      Asthma Control Test:   ACT Total Scores 8/8/2017   ACT TOTAL SCORE -   ASTHMA ER VISITS -   ASTHMA HOSPITALIZATIONS -   ACT TOTAL SCORE (Goal Greater than or Equal to 20) 23   In the past 12 months, how many times did you visit the emergency room for your asthma without being admitted to the hospital? 0   In the past 12 months, how many times were you hospitalized overnight because of your asthma? 0       Date of Last Spirometry Test:   No results found for this or any previous visit.

## 2017-10-06 NOTE — TELEPHONE ENCOUNTER
Per 08/08 OV note: f/u in 1 month.    Called pt, left detailed vm (per consent to communicate) stating due for appt d/t anxiety medication. Asked pt to call with update if he's still taking the medication.

## 2017-10-20 RX ORDER — ALBUTEROL SULFATE 90 UG/1
AEROSOL, METERED RESPIRATORY (INHALATION)
Qty: 18 G | Refills: 0 | Status: SHIPPED | OUTPATIENT
Start: 2017-10-20 | End: 2017-11-02

## 2017-10-20 NOTE — TELEPHONE ENCOUNTER
Pt's wife Rachael left voice message on 10/19/17 at 1734. Pt has been trying to get refill for a few weeks, she request we call her or pt with update if there is an issue getting this filled.     Contacted Rachael - consent to communicate on file.   She states pt took anxiety medication for a few weeks, but then forgot and hasn't restarted it because he didn't notice any difference while on the medication. Advised her to have pt schedule follow up appt to discuss other options for anxiety symptoms. She agrees, appt scheduled with Ava.    Next 5 appointments (look out 90 days)     Nov 02, 2017  5:20 PM CDT   [facility] with RAGHU Wilcox CNP   [facility]    [address]  [facility] [address] [phone]                 Medication is being filled for 1 time refill only due to:  Patient needs to be seen because for follow up. Future appt scheduled.

## 2017-11-02 ENCOUNTER — OFFICE VISIT (OUTPATIENT)
Dept: INTERNAL MEDICINE | Facility: CLINIC | Age: 42
End: 2017-11-02
Payer: COMMERCIAL

## 2017-11-02 VITALS
OXYGEN SATURATION: 97 % | SYSTOLIC BLOOD PRESSURE: 104 MMHG | HEART RATE: 88 BPM | BODY MASS INDEX: 28.17 KG/M2 | HEIGHT: 72 IN | WEIGHT: 208 LBS | DIASTOLIC BLOOD PRESSURE: 62 MMHG | TEMPERATURE: 98.4 F

## 2017-11-02 DIAGNOSIS — J45.20 INTERMITTENT ASTHMA, UNCOMPLICATED: ICD-10-CM

## 2017-11-02 DIAGNOSIS — F41.1 GENERALIZED ANXIETY DISORDER: ICD-10-CM

## 2017-11-02 DIAGNOSIS — Z00.00 ROUTINE ADULT HEALTH MAINTENANCE: Primary | ICD-10-CM

## 2017-11-02 PROCEDURE — 99213 OFFICE O/P EST LOW 20 MIN: CPT | Mod: 25 | Performed by: NURSE PRACTITIONER

## 2017-11-02 PROCEDURE — 90686 IIV4 VACC NO PRSV 0.5 ML IM: CPT | Performed by: NURSE PRACTITIONER

## 2017-11-02 PROCEDURE — 90471 IMMUNIZATION ADMIN: CPT | Performed by: NURSE PRACTITIONER

## 2017-11-02 RX ORDER — ALBUTEROL SULFATE 90 UG/1
2 AEROSOL, METERED RESPIRATORY (INHALATION) EVERY 4 HOURS PRN
Qty: 18 G | Refills: 6 | Status: SHIPPED | OUTPATIENT
Start: 2017-11-02 | End: 2019-06-11

## 2017-11-02 RX ORDER — LORAZEPAM 0.5 MG/1
1 TABLET ORAL EVERY 8 HOURS PRN
Qty: 40 TABLET | Refills: 0 | Status: SHIPPED | OUTPATIENT
Start: 2017-11-02 | End: 2018-05-29

## 2017-11-02 ASSESSMENT — ANXIETY QUESTIONNAIRES
6. BECOMING EASILY ANNOYED OR IRRITABLE: SEVERAL DAYS
5. BEING SO RESTLESS THAT IT IS HARD TO SIT STILL: NOT AT ALL
GAD7 TOTAL SCORE: 3
GAD7 TOTAL SCORE: 3
3. WORRYING TOO MUCH ABOUT DIFFERENT THINGS: NOT AT ALL
GAD7 TOTAL SCORE: 3
7. FEELING AFRAID AS IF SOMETHING AWFUL MIGHT HAPPEN: NOT AT ALL
4. TROUBLE RELAXING: NOT AT ALL
1. FEELING NERVOUS, ANXIOUS, OR ON EDGE: MORE THAN HALF THE DAYS
7. FEELING AFRAID AS IF SOMETHING AWFUL MIGHT HAPPEN: NOT AT ALL
2. NOT BEING ABLE TO STOP OR CONTROL WORRYING: NOT AT ALL

## 2017-11-02 NOTE — MR AVS SNAPSHOT
After Visit Summary   11/2/2017    Reinier Monge    MRN: [id number]           Patient Information     Date Of Birth          1975        Visit Information        Provider Department      11/2/2017 5:20 PM Ava Zelaya APRN CNP [facility]        Today's Diagnoses     Routine adult health maintenance    -  1    Generalized anxiety disorder        Intermittent asthma, uncomplicated          Care Instructions    Consider fasting lab in a year     Let me know if you start wellbutrin           Follow-ups after your visit        Who to contact     If you have questions or need follow up information about today's clinic visit or your schedule please contact [facility] directly at [phone].  Normal or non-critical lab and imaging results will be communicated to you by MyChart, letter or phone within 4 business days after the clinic has received the results. If you do not hear from us within 7 days, please contact the clinic through Scanaduhart or phone. If you have a critical or abnormal lab result, we will notify you by phone as soon as possible.  Submit refill requests through Virtuix or call your pharmacy and they will forward the refill request to us. Please allow 3 business days for your refill to be completed.          Additional Information About Your Visit        MyChart Information     Virtuix gives you secure access to your electronic health record. If you see a primary care provider, you can also send messages to your care team and make appointments. If you have questions, please call your primary care clinic.  If you do not have a primary care provider, please call [phone] and they will assist you.        Care EveryWhere ID     This is your Care EveryWhere ID. This could be used by other organizations to access your [place] medical records  [id number]        Your Vitals Were     Pulse Temperature Height Pulse Oximetry BMI (Body Mass  "Index)       88 98.4  F (36.9  C) (Oral) 5' 11.75\" (1.822 m) 97% 28.41 kg/m2        Blood Pressure from Last 3 Encounters:   11/02/17 104/62   08/08/17 110/68   04/06/17 116/74    Weight from Last 3 Encounters:   11/02/17 208 lb (94.3 kg)   08/08/17 203 lb (92.1 kg)   04/06/17 213 lb (96.6 kg)              We Performed the Following     HC FLU VAC PRESRV FREE QUAD SPLIT VIR 3+YRS IM          Today's Medication Changes          These changes are accurate as of: 11/2/17  5:44 PM.  If you have any questions, ask your nurse or doctor.               These medicines have changed or have updated prescriptions.        Dose/Directions    albuterol 108 (90 BASE) MCG/ACT Inhaler   Commonly known as:  VENTOLIN HFA   This may have changed:  See the new instructions.   Used for:  Intermittent asthma, uncomplicated   Changed by:  Ava Zelaya APRN CNP        Dose:  2 puff   Inhale 2 puffs into the lungs every 4 hours as needed for shortness of breath / dyspnea or wheezing   Quantity:  18 g   Refills:  6            Where to get your medicines      These medications were sent to [id number] [place], [place] - [address] [place] [address] [address] [place] [address]. , [place] [id number]     Phone:  [phone]     albuterol 108 (90 BASE) MCG/ACT Inhaler         Some of these will need a paper prescription and others can be bought over the counter.  Ask your nurse if you have questions.     Bring a paper prescription for each of these medications     LORazepam 0.5 MG tablet                Primary Care Provider Office Phone # Fax #    Charlie Thacker [phone]       [address] [facility] [address]  [facility] [address]        Equal Access to Services     LOLLY MATHEW : Aba Dominguez, pat rizo, qaalexta kaalmaanand au, cecile de jesus. So [facility] [phone].    ATENCIÓN: Si habla español, tiene a quiroz disposición servicios gratuitos de asistencia lingüística. Llame al " [phone].    We comply with applicable federal civil rights laws and [place] laws. We do not discriminate on the basis of race, color, national origin, age, disability, sex, sexual orientation, or gender identity.            Thank you!     Thank you for choosing [facility]  for your care. Our goal is always to provide you with excellent care. Hearing back from our patients is one way we can continue to improve our services. Please take a few minutes to complete the written survey that you may receive in the mail after your visit with us. Thank you!             Your Updated Medication List - Protect others around you: Learn how to safely use, store and throw away your medicines at www.disposemymeds.org.          This list is accurate as of: 11/2/17  5:44 PM.  Always use your most recent med list.                   Brand Name Dispense Instructions for use Diagnosis    albuterol 108 (90 BASE) MCG/ACT Inhaler    VENTOLIN HFA    18 g    Inhale 2 puffs into the lungs every 4 hours as needed for shortness of breath / dyspnea or wheezing    Intermittent asthma, uncomplicated       buPROPion 150 MG 24 hr tablet    WELLBUTRIN XL    30 tablet    Take 1 tablet (150 mg) by mouth every morning    Anxiety       IBUPROFEN PO           LORazepam 0.5 MG tablet    ATIVAN    40 tablet    Take 2 tablets (1 mg) by mouth every 8 hours as needed for anxiety    Generalized anxiety disorder

## 2017-11-02 NOTE — NURSING NOTE
"Chief Complaint   Patient presents with     Recheck Medication       Initial /62 (BP Location: Left arm, Patient Position: Sitting, Cuff Size: Adult Large)  Pulse 88  Temp 98.4  F (36.9  C) (Oral)  Ht 5' 11.75\" (1.822 m)  Wt 208 lb (94.3 kg)  SpO2 97%  BMI 28.41 kg/m2 Estimated body mass index is 28.41 kg/(m^2) as calculated from the following:    Height as of this encounter: 5' 11.75\" (1.822 m).    Weight as of this encounter: 208 lb (94.3 kg).  Medication Reconciliation: complete    "

## 2017-11-02 NOTE — NURSING NOTE
"Injectable Influenza Immunization Documentation    1.  Has the patient received the information for the injectable influenza vaccine? YES     2. Is the patient 6 months of age or older? YES     3. Does the patient have any of the following contraindications?         Severe allergy to eggs? No     Severe allergic reaction to previous influenza vaccines? No   Severe allergy to latex? No       History of Guillain-Stratton syndrome? No     Currently have a temperature greater than 100.4F? No        4.  Severely egg allergic patients should have flu vaccine eligibility assessed by an MD, RN, or pharmacist, and those who received flu vaccine should be observed for 15 min by an MD, RN, Pharmacist, Medical Technician, or member of clinic staff.\": YES    5. Latex-allergic patients should be given latex-free influenza vaccine Yes. Please reference the Vaccine latex table to determine if your clinic s product is latex-containing.       Vaccination given by .SALIMA UREÑA LPN          "

## 2017-11-02 NOTE — PROGRESS NOTES
".  SUBJECTIVE:   Reinier Monge is a 42 year old male who presents to clinic today for the following health issues:  Answers for HPI/ROS submitted by the patient on 2017   MARCELO 7 TOTAL SCORE: 3    Med recheck    Not taking wellbutrin for a while   Took and got a cold and he stopped taking it   Not using ativan much - occasionally     Work checks cholesterol yearly and has been ok  HDL has been a little low               Problem list and histories reviewed & adjusted, as indicated.  Additional history: as documented    Patient Active Problem List   Diagnosis     Acute gastritis     Generalized anxiety disorder     Intermittent asthma     CARDIOVASCULAR SCREENING; LDL GOAL LESS THAN 160     Lateral epicondylitis of right elbow     Past Surgical History:   Procedure Laterality Date     C NONSPECIFIC PROCEDURE Right ~2009    Meniscus tear       Social History   Substance Use Topics     Smoking status: Never Smoker     Smokeless tobacco: Never Used     Alcohol use No     Family History   Problem Relation Age of Onset     Psychotic Disorder Mother      Born , Anxiety     Family History Negative Father       age 50, from alcholism and smoking and cancer hx      Family History Negative Brother      Born      CANCER Other      nephew testical cancer     Cancer - colorectal No family hx of      Prostate Cancer No family hx of              Reviewed and updated as needed this visit by clinical staffTobacco  Allergies  Meds  Med Hx  Surg Hx  Fam Hx  Soc Hx      Reviewed and updated as needed this visit by Provider  Allergies         ROS:  Constitutional, HEENT, cardiovascular, pulmonary, GI, , musculoskeletal, neuro, skin, endocrine and psych systems are negative, except as otherwise noted.      OBJECTIVE:   /62 (BP Location: Left arm, Patient Position: Sitting, Cuff Size: Adult Large)  Pulse 88  Temp 98.4  F (36.9  C) (Oral)  Ht 5' 11.75\" (1.822 m)  Wt 208 lb (94.3 kg)  SpO2 97%  BMI " 28.41 kg/m2  Body mass index is 28.41 kg/(m^2).  GENERAL: alert and no distress  RESP: lungs clear to auscultation - no rales, rhonchi or wheezes  CV: regular rate and rhythm, normal S1 S2, no S3 or S4, no murmur, click or rub, no peripheral edema  ABDOMEN: soft, nontenderand bowel sounds normal  MS: no gross musculoskeletal defects noted, no edema  NEURO: Normal strength and tone, mentation intact and speech normal  PSYCH: mentation appears normal, affect normal/bright    Diagnostic Test Results:  none     ASSESSMENT/PLAN:             1. Routine adult health maintenance    - HC FLU VAC PRESRV FREE QUAD SPLIT VIR 3+YRS IM    2. Generalized anxiety disorder  In good control- ativan sparingly needed   Driving is worse trigger but doing ok   Did not take wellbutrin ongoing as he forgot- will let me know if he wants to restart medication trial    - LORazepam (ATIVAN) 0.5 MG tablet; Take 2 tablets (1 mg) by mouth every 8 hours as needed for anxiety  Dispense: 40 tablet; Refill: 0    3. Intermittent asthma, uncomplicated  Well controlled - needs refill  - albuterol (VENTOLIN HFA) 108 (90 BASE) MCG/ACT Inhaler; Inhale 2 puffs into the lungs every 4 hours as needed for shortness of breath / dyspnea or wheezing  Dispense: 18 g; Refill: 6    Patient Instructions   Consider fasting lab in a year     Let me know if you start wellbutrin       RAGHU Wilcox [facility]

## 2017-11-03 ASSESSMENT — ANXIETY QUESTIONNAIRES: GAD7 TOTAL SCORE: 3

## 2018-04-17 ENCOUNTER — OFFICE VISIT (OUTPATIENT)
Dept: INTERNAL MEDICINE | Facility: CLINIC | Age: 43
End: 2018-04-17
Payer: COMMERCIAL

## 2018-04-17 VITALS
SYSTOLIC BLOOD PRESSURE: 110 MMHG | WEIGHT: 219.9 LBS | OXYGEN SATURATION: 97 % | TEMPERATURE: 98.7 F | BODY MASS INDEX: 29.78 KG/M2 | HEIGHT: 72 IN | DIASTOLIC BLOOD PRESSURE: 80 MMHG | HEART RATE: 88 BPM

## 2018-04-17 DIAGNOSIS — M67.911 DISORDER OF RIGHT ROTATOR CUFF: ICD-10-CM

## 2018-04-17 DIAGNOSIS — M25.511 ACUTE PAIN OF RIGHT SHOULDER: Primary | ICD-10-CM

## 2018-04-17 PROCEDURE — 99213 OFFICE O/P EST LOW 20 MIN: CPT | Performed by: NURSE PRACTITIONER

## 2018-04-17 NOTE — NURSING NOTE
"Chief Complaint   Patient presents with     Shoulder right       Initial /80 (Cuff Size: Adult Large)  Pulse 88  Temp 98.7  F (37.1  C) (Oral)  Ht 5' 11.75\" (1.822 m)  Wt 219 lb 14.4 oz (99.7 kg)  SpO2 97%  BMI 30.03 kg/m2 Estimated body mass index is 30.03 kg/(m^2) as calculated from the following:    Height as of this encounter: 5' 11.75\" (1.822 m).    Weight as of this encounter: 219 lb 14.4 oz (99.7 kg).  Medication Reconciliation: complete    "

## 2018-04-17 NOTE — MR AVS SNAPSHOT
After Visit Summary   4/17/2018    Reinier Monge    MRN: [id number]           Patient Information     Date Of Birth          1975        Visit Information        Provider Department      4/17/2018 3:40 PM Ava eZlaya APRN [facility]        Today's Diagnoses     Acute pain of right shoulder    -  1      Care Instructions    May ice shoulder     Ibuprofen 600-800 mg every 8 hours if needed for pain    May use sling for comfort      April 19, 2018 at 340 pm   Dr Beltran   [address] [facility]  [address]          Follow-ups after your visit        Additional Services     ORTHO  REFERRAL       [facility] is referring you to the Orthopedic  Services at [facility] and Orthopedic [facility].       The  Representative will assist you in the coordination of your Orthopedic and Musculoskeletal Care as prescribed by your physician.    The  Representative will call you within 1 business day to help schedule your appointment, or you may contact the  Representative at:    All areas ~ [phone]     Type of Referral : Non Surgical       Timeframe requested: 3 - 5 days    Coverage of these services is subject to the terms and limitations of your health insurance plan.  Please call member services at your health plan with any benefit or coverage questions.      If X-rays, CT or MRI's have been performed, please contact the facility where they were done to arrange for , prior to your scheduled appointment.  Please bring this referral request to your appointment and present it to your specialist.                  Your next 10 appointments already scheduled     Apr 19, 2018  3:40 PM CDT   New Visit with Gregorio Beltran MD   [facility] ORTHOPEDIC SURGERY ([facility] [place])    [address] [facility] [address] [facility]  [address]  [facility] [place] [address]   [phone]              Who to contact     If you have  "questions or need follow up information about today's clinic visit or your schedule please contact [facility] directly at [phone].  Normal or non-critical lab and imaging results will be communicated to you by Pure360hart, letter or phone within 4 business days after the clinic has received the results. If you do not hear from us within 7 days, please contact the clinic through Protalext or phone. If you have a critical or abnormal lab result, we will notify you by phone as soon as possible.  Submit refill requests through in3Dgallery or call your pharmacy and they will forward the refill request to us. Please allow 3 business days for your refill to be completed.          Additional Information About Your Visit        Pure360harEureka King Information     in3Dgallery gives you secure access to your electronic health record. If you see a primary care provider, you can also send messages to your care team and make appointments. If you have questions, please call your primary care clinic.  If you do not have a primary care provider, please call [phone] and they will assist you.        Care EveryWhere ID     This is your Care EveryWhere ID. This could be used by other organizations to access your [place] medical records  [id number]        Your Vitals Were     Pulse Temperature Height Pulse Oximetry BMI (Body Mass Index)       88 98.7  F (37.1  C) (Oral) 5' 11.75\" (1.822 m) 97% 30.03 kg/m2        Blood Pressure from Last 3 Encounters:   04/17/18 110/80   11/02/17 104/62   08/08/17 110/68    Weight from Last 3 Encounters:   04/17/18 219 lb 14.4 oz (99.7 kg)   11/02/17 208 lb (94.3 kg)   08/08/17 203 lb (92.1 kg)              We Performed the Following     ORTHO  REFERRAL          Today's Medication Changes          These changes are accurate as of 4/17/18  4:34 PM.  If you have any questions, ask your nurse or doctor.               Start taking these medicines.        Dose/Directions    order for DME   Used " for:  Acute pain of right shoulder   Started by:  Ava Zelaya APRN CNP        Equipment being ordered: sling   Quantity:  1 Device   Refills:  0         Stop taking these medicines if you haven't already. Please contact your care team if you have questions.     buPROPion 150 MG 24 hr tablet   Commonly known as:  WELLBUTRIN XL   Stopped by:  Ava Zelaya APRN CNP                Where to get your medicines      Some of these will need a paper prescription and others can be bought over the counter.  Ask your nurse if you have questions.     Bring a paper prescription for each of these medications     order for DME                Primary Care Provider Office Phone # Fax #    Charlie Thacker [phone]       [address] [address]        Equal Access to Services     [facility] HospitalRODRIGO : Hadii alpa ruvalcabao Soleena, waaxda luqadaha, qaybta kaalmada adeegyada, cecile escobar . So [facility] [phone].    ATENCIÓN: Si habla español, tiene a quiroz disposición servicios gratuitos de asistencia lingüística. Llame al [phone].    We comply with applicable federal civil rights laws and [place] laws. We do not discriminate on the basis of race, color, national origin, age, disability, sex, sexual orientation, or gender identity.            Thank you!     Thank you for choosing [facility]  for your care. Our goal is always to provide you with excellent care. Hearing back from our patients is one way we can continue to improve our services. Please take a few minutes to complete the written survey that you may receive in the mail after your visit with us. Thank you!             Your Updated Medication List - Protect others around you: Learn how to safely use, store and throw away your medicines at www.disposemymeds.org.          This list is accurate as of 4/17/18  4:34 PM.  Always use your most recent med list.                   Brand  Name Dispense Instructions for use Diagnosis    albuterol 108 (90 Base) MCG/ACT Inhaler    VENTOLIN HFA    18 g    Inhale 2 puffs into the lungs every 4 hours as needed for shortness of breath / dyspnea or wheezing    Intermittent asthma, uncomplicated       IBUPROFEN PO           LORazepam 0.5 MG tablet    ATIVAN    40 tablet    Take 2 tablets (1 mg) by mouth every 8 hours as needed for anxiety    Generalized anxiety disorder       order for DME     1 Device    Equipment being ordered: sling    Acute pain of right shoulder

## 2018-04-17 NOTE — PROGRESS NOTES
SUBJECTIVE:   Reinier Monge is a 42 year old male who presents to clinic today for the following health issues:    Patient here with right shoulder pain.  Injured a month ago at volleyball tournament with spiking volleyball  Was not able to use it well after that   Seemed to be getting  Better - not doing sports for last month     Ibuprofen helped some   Shoveling - or snowblower was not great and more pain    This morning he was pushing off to get out of bed and hurt it and put him into a panic attack     Pain across the top of shoulder inside,  and if bent forward across chest he cannot lift arm   Anterior shoulder pain touching opposite arm  Cannot have hand in front of chest to lift arm   Can put hand behind head lifting to side   Putting behind back also painful and hard to move back to front- used opposite hand          Problem list and histories reviewed & adjusted, as indicated.  Additional history: as documented    Patient Active Problem List   Diagnosis     Acute gastritis     Generalized anxiety disorder     Intermittent asthma     CARDIOVASCULAR SCREENING; LDL GOAL LESS THAN 160     Lateral epicondylitis of right elbow     Past Surgical History:   Procedure Laterality Date     C NONSPECIFIC PROCEDURE Right ~2009    Meniscus tear       Social History   Substance Use Topics     Smoking status: Never Smoker     Smokeless tobacco: Never Used     Alcohol use No     Family History   Problem Relation Age of Onset     Psychotic Disorder Mother      Born 1951, Anxiety     Family History Negative Father       age 50, from alcholism and smoking and cancer hx      Family History Negative Brother      Born      CANCER Other      nephew testical cancer     Cancer - colorectal No family hx of      Prostate Cancer No family hx of            Reviewed and updated as needed this visit by clinical staff  Tobacco  Allergies  Meds  Problems  Med Hx  Surg Hx  Fam Hx  Soc Hx        Reviewed and updated  "as needed this visit by Provider         ROS:  Constitutional, HEENT, cardiovascular, pulmonary, gi and gu systems are negative, except as otherwise noted.    OBJECTIVE:     /80 (Cuff Size: Adult Large)  Pulse 88  Temp 98.7  F (37.1  C) (Oral)  Ht 5' 11.75\" (1.822 m)  Wt 219 lb 14.4 oz (99.7 kg)  SpO2 97%  BMI 30.03 kg/m2  Body mass index is 30.03 kg/(m^2).  GENERAL: healthy, alert and no distress  MS: no obvious swelling or bruising   No pian with palpation of shoulder, arm  or trapezius muscles   Pain with movement lifting up with hand across chest cannot lift very high   To side is better   Cannot maintain against resistance without pain    PSYCH: mentation appears normal, affect normal/bright    Diagnostic Test Results:  none    ASSESSMENT/PLAN:             1. Acute pain of right shoulder    - ORTHO  REFERRAL  - order for DME; Equipment being ordered: sling  Dispense: 1 Device; Refill: 0    2. Disorder of right rotator cuff        Patient Instructions   May ice shoulder     Ibuprofen 600-800 mg every 8 hours if needed for pain    May use sling for comfort      April 19, 2018 at 340 pm   Dr Beltran   [address]  [address]      RAGHU Wilcox [facility]    "

## 2018-04-17 NOTE — PATIENT INSTRUCTIONS
May ice shoulder     Ibuprofen 600-800 mg every 8 hours if needed for pain    May use sling for comfort      April 19, 2018 at 340 pm   Dr Beltran   [address] [facility]  [address]

## 2018-04-18 ASSESSMENT — ASTHMA QUESTIONNAIRES: ACT_TOTALSCORE: 24

## 2018-04-19 ENCOUNTER — OFFICE VISIT (OUTPATIENT)
Dept: ORTHOPEDICS | Facility: CLINIC | Age: 43
End: 2018-04-19
Payer: COMMERCIAL

## 2018-04-19 VITALS
BODY MASS INDEX: 29.66 KG/M2 | HEIGHT: 72 IN | WEIGHT: 219 LBS | SYSTOLIC BLOOD PRESSURE: 124 MMHG | DIASTOLIC BLOOD PRESSURE: 82 MMHG

## 2018-04-19 DIAGNOSIS — M75.101 ROTATOR CUFF SYNDROME OF RIGHT SHOULDER: Primary | ICD-10-CM

## 2018-04-19 PROCEDURE — 99243 OFF/OP CNSLTJ NEW/EST LOW 30: CPT | Performed by: ORTHOPAEDIC SURGERY

## 2018-04-19 NOTE — LETTER
2018         RE: Reinier Monge  [address]  [facility] [address]        Dear Colleague,    Thank you for referring your patient, Reinier Monge, to the [facility] ORTHOPEDIC SURGERY. Please see a copy of my visit note below.    HISTORY OF PRESENT ILLNESS:    Reinier Monge is a 42 year old RHD male who is seen in consultation at the request of Dr. Zelaya for right lateral and posterior shoulder pain of about one month duration from possible overuse at a volleyball tournament. He also had some increased pain 18 when he went to press himself up out of bed and felt sharp pain in the shoulder.    Present symptoms: sharp pain with movement, no radicular pain, no mechanical symptoms  Treatments tried to this point: ice, rest, ibuprofen  Orthopedic PMH: RT Elbow lateral epicondylitis     Past Medical History:   Diagnosis Date     Mild intermittent asthma     Rare need for albuterol inhaler, mostly in sprin       Past Surgical History:   Procedure Laterality Date     C NONSPECIFIC PROCEDURE Right ~2009    Meniscus tear       Family History   Problem Relation Age of Onset     Psychotic Disorder Mother      Born [birth year], Anxiety     Family History Negative Father       age 50, from alcholism and smoking and cancer hx      Family History Negative Brother      Born      CANCER Other      nephew testical cancer     Cancer - colorectal No family hx of      Prostate Cancer No family hx of        Social History     Social History     Marital status:      Spouse name: N/A     Number of children: 1     Years of education: N/A     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 No     Social History Narrative     , 6 yo son, works as a .    Plays on three softball teams, bowls year round       Current Outpatient Prescriptions   Medication Sig Dispense Refill     albuterol (VENTOLIN HFA) 108 (90 BASE) MCG/ACT Inhaler Inhale 2 puffs into the lungs every 4 hours as needed for shortness of breath / dyspnea or wheezing 18 g 6     IBUPROFEN PO        LORazepam (ATIVAN) 0.5 MG tablet Take 2 tablets (1 mg) by mouth every 8 hours as needed for anxiety 40 tablet 0     order for DME Equipment being ordered: sling 1 Device 0       Allergies   Allergen Reactions     Seasonal Allergies      Amoxicillin Rash       REVIEW OF SYSTEMS:  CONSTITUTIONAL:  NEGATIVE for fever, chills, change in weight  INTEGUMENTARY/SKIN:  NEGATIVE for worrisome rashes, moles or lesions  EYES:  NEGATIVE for vision changes or irritation  ENT/MOUTH:  NEGATIVE for ear, mouth and throat problems  RESP:  NEGATIVE for significant cough or SOB  BREAST:  NEGATIVE for masses, tenderness or discharge  CV:  NEGATIVE for chest pain, palpitations or peripheral edema  GI:  NEGATIVE for nausea, abdominal pain, heartburn, or change in bowel habits  :  Negative   MUSCULOSKELETAL:  See HPI above  NEURO:  NEGATIVE for weakness, dizziness or paresthesias  ENDOCRINE:  NEGATIVE for temperature intolerance, skin/hair changes  HEME/ALLERGY/IMMUNE:  NEGATIVE for bleeding problems  PSYCHIATRIC:  NEGATIVE for changes in mood or affect      PHYSICAL EXAM:  /82  Ht 6' (1.829 m)  Wt 219 lb (99.3 kg)  BMI 29.7 kg/m2  Body mass index is 29.7 kg/(m^2).   GENERAL APPEARANCE: healthy, alert and no distress   SKIN: no suspicious lesions or rashes  NEURO: Normal strength and tone, mentation intact and speech normal  VASCULAR: Good pulses, and capillary refill   LYMPH: no lymphadenopathy   PSYCH:  mentation appears normal and affect normal/bright    MSK:  Examination of the neck and shoulder girdle musculature reveals no asymmetry, or atrophy to the muscle masses.  There  is no erythema, ecchymosis or edema.  There is a normal lordosis to the cervical and lumbar spine regions.  There is a normal kyphosis to the thoracic spine.  There is no clinical evidence of scoliosis. There is no tenderness to palpation or percussion.  There is no paraspinal muscle spasm present.  There is a Normal range of motion to the cervical spine. Forward flexion to 45, extension to 55, R and L lateral bending to 45, and rotation to 70, both R and L.    Strength : Bicep 5/5   C5-6       Sensation :     Deltoid 5/5   C5    Lateral Arm    Wrist extensors 5/5  C6    Thumb    Tricep  5/5   C7    Middle Finger    Finger flexors  5/5  C8    Little Finger    Interossei  5/5   T1    Medial Arm    Reflexes :  Bicep   Symmetric  C5-6    BR Symmetric  C6    Tricep Symmetric  C7    Shoulder:  Examination of the right shoulder reveals a full active ROM and full passive ROM.    Forward Flexion : 180 degrees Pain  YES --   Abduction  :  180 degrees  YES --   Internal Rotation : thoracic 8   YES --  Subscap Lift-off test negative  External Rotation :    0 Deg-90  90 Deg- 90  Contralateral side symmetric    There is no tenderness to palpation about the AC joint, anterior capsule or Bicep tendon.  There is mild tenderness to palpation in the subacromial space.  There is give-way rotator cuff weakness.  Speed's an Yergason's Tests for Bicep pathology are negative. Arm adduction does not cause pain in the AC joint.  Apprehension sign is negative. Scapular winging is not present. ROM of the elbow and wrist is normal and CMS is intact to fingertips.                 ASSESSMENT / PLAN: Possible rotator cuff tear right shoulder.  I ordered an MRI and follow-up after that exam.        Imaging Interpretation:         Tarik Beltran MD  Department of Orthopedic Surgery          Again, thank you for allowing me to participate in the care of your patient.        Sincerely,        Gregorio Beltran MD

## 2018-04-19 NOTE — PROGRESS NOTES
HISTORY OF PRESENT ILLNESS:    Reinier Monge is a 42 year old RHD male who is seen in consultation at the request of Dr. Zelaya for right lateral and posterior shoulder pain of about one month duration from possible overuse at a volleyball tournament. He also had some increased pain 18 when he went to press himself up out of bed and felt sharp pain in the shoulder.    Present symptoms: sharp pain with movement, no radicular pain, no mechanical symptoms  Treatments tried to this point: ice, rest, ibuprofen  Orthopedic PMH: RT Elbow lateral epicondylitis     Past Medical History:   Diagnosis Date     Mild intermittent asthma     Rare need for albuterol inhaler, mostly in sprin       Past Surgical History:   Procedure Laterality Date     C NONSPECIFIC PROCEDURE Right ~2009    Meniscus tear       Family History   Problem Relation Age of Onset     Psychotic Disorder Mother      Born , Anxiety     Family History Negative Father       age 50, from alcholism and smoking and cancer hx      Family History Negative Brother      Born      CANCER Other      nephew testical cancer     Cancer - colorectal No family hx of      Prostate Cancer No family hx of        Social History     Social History     Marital status:      Spouse name: N/A     Number of children: 1     Years of education: N/A     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 No     Social History Narrative    , 4 yo son, works as a .    Plays on three softball teams, bowls year round       Current Outpatient Prescriptions   Medication Sig Dispense Refill     albuterol (VENTOLIN HFA) 108 (90 BASE) MCG/ACT Inhaler Inhale 2 puffs into the lungs every 4 hours  as needed for shortness of breath / dyspnea or wheezing 18 g 6     IBUPROFEN PO        LORazepam (ATIVAN) 0.5 MG tablet Take 2 tablets (1 mg) by mouth every 8 hours as needed for anxiety 40 tablet 0     order for DME Equipment being ordered: sling 1 Device 0       Allergies   Allergen Reactions     Seasonal Allergies      Amoxicillin Rash       REVIEW OF SYSTEMS:  CONSTITUTIONAL:  NEGATIVE for fever, chills, change in weight  INTEGUMENTARY/SKIN:  NEGATIVE for worrisome rashes, moles or lesions  EYES:  NEGATIVE for vision changes or irritation  ENT/MOUTH:  NEGATIVE for ear, mouth and throat problems  RESP:  NEGATIVE for significant cough or SOB  BREAST:  NEGATIVE for masses, tenderness or discharge  CV:  NEGATIVE for chest pain, palpitations or peripheral edema  GI:  NEGATIVE for nausea, abdominal pain, heartburn, or change in bowel habits  :  Negative   MUSCULOSKELETAL:  See HPI above  NEURO:  NEGATIVE for weakness, dizziness or paresthesias  ENDOCRINE:  NEGATIVE for temperature intolerance, skin/hair changes  HEME/ALLERGY/IMMUNE:  NEGATIVE for bleeding problems  PSYCHIATRIC:  NEGATIVE for changes in mood or affect      PHYSICAL EXAM:  /82  Ht 6' (1.829 m)  Wt 219 lb (99.3 kg)  BMI 29.7 kg/m2  Body mass index is 29.7 kg/(m^2).   GENERAL APPEARANCE: healthy, alert and no distress   SKIN: no suspicious lesions or rashes  NEURO: Normal strength and tone, mentation intact and speech normal  VASCULAR: Good pulses, and capillary refill   LYMPH: no lymphadenopathy   PSYCH:  mentation appears normal and affect normal/bright    MSK:  Examination of the neck and shoulder girdle musculature reveals no asymmetry, or atrophy to the muscle masses.  There is no erythema, ecchymosis or edema.  There is a normal lordosis to the cervical and lumbar spine regions.  There is a normal kyphosis to the thoracic spine.  There is no clinical evidence of scoliosis. There is no tenderness to palpation or percussion.  There is no  paraspinal muscle spasm present.  There is a Normal range of motion to the cervical spine. Forward flexion to 45, extension to 55, R and L lateral bending to 45, and rotation to 70, both R and L.    Strength : Bicep 5/5   C5-6       Sensation :     Deltoid 5/5   C5    Lateral Arm    Wrist extensors 5/5  C6    Thumb    Tricep  5/5   C7    Middle Finger    Finger flexors  5/5  C8    Little Finger    Interossei  5/5   T1    Medial Arm    Reflexes :  Bicep   Symmetric  C5-6    BR Symmetric  C6    Tricep Symmetric  C7    Shoulder:  Examination of the right shoulder reveals a full active ROM and full passive ROM.    Forward Flexion : 180 degrees Pain  YES --   Abduction  :  180 degrees  YES --   Internal Rotation : thoracic 8   YES --  Subscap Lift-off test negative  External Rotation :    0 Deg-90  90 Deg- 90  Contralateral side symmetric    There is no tenderness to palpation about the AC joint, anterior capsule or Bicep tendon.  There is mild tenderness to palpation in the subacromial space.  There is give-way rotator cuff weakness.  Speed's an Yergason's Tests for Bicep pathology are negative. Arm adduction does not cause pain in the AC joint.  Apprehension sign is negative. Scapular winging is not present. ROM of the elbow and wrist is normal and CMS is intact to fingertips.                 ASSESSMENT / PLAN: Possible rotator cuff tear right shoulder.  I ordered an MRI and follow-up after that exam.        Imaging Interpretation:         Tarik Beltran MD  Department of Orthopedic Surgery

## 2018-04-19 NOTE — MR AVS SNAPSHOT
After Visit Summary   4/19/2018    Reinier Monge    MRN: [id number]           Patient Information     Date Of Birth          1975        Visit Information        Provider Department      4/19/2018 3:40 PM Gregorio Beltran MD [facility] ORTHOPEDIC SURGERY        Today's Diagnoses     Rotator cuff syndrome of right shoulder    -  1      Care Instructions     Advanced imaging is done by appointment. Some insurance require a prior authorization to be completed which may delay the time until you are able to schedule your appointment. You should be receiving a call from the scheduling department, if you have not heard from them in 24-48 hours.   Please call [facility] [place]: [phone] to schedule your right shoulder MRI.  Depending on your availability you can usually schedule within the next 1-2 days.    Please make a follow up appointment in the clinic at least 2 days following your MRI by calling [phone].                Follow-ups after your visit        Who to contact     If you have questions or need follow up information about today's clinic visit or your schedule please contact [facility] ORTHOPEDIC SURGERY directly at [phone].  Normal or non-critical lab and imaging results will be communicated to you by [employer], letter or phone within 4 business days after the clinic has received the results. If you do not hear from us within 7 days, please contact the clinic through [employer]ort[employer] or phone. If you have a critical or abnormal lab result, we will notify you by phone as soon as possible.  Submit refill requests through [employer] or call your pharmacy and they will forward the refill request to us. Please allow 3 business days for your refill to be completed.          Additional Information About Your Visit        [employer] Information     [employer] gives you secure access to your electronic health record. If you see a primary care provider, you can also send  messages to your care team and make appointments. If you have questions, please call your primary care clinic.  If you do not have a primary care provider, please call [phone] and they will assist you.        Care EveryWhere ID     This is your Care EveryWhere ID. This could be used by other organizations to access your [facility] medical records  [id number]        Your Vitals Were     Height BMI (Body Mass Index)                6' (1.829 m) 29.7 kg/m2           Blood Pressure from Last 3 Encounters:   04/19/18 124/82   04/17/18 110/80   11/02/17 104/62    Weight from Last 3 Encounters:   04/19/18 219 lb (99.3 kg)   04/17/18 219 lb 14.4 oz (99.7 kg)   11/02/17 208 lb (94.3 kg)              Today, you had the following     No orders found for display       Primary Care Provider Office Phone # Fax #    Charlie Thacker [phone]       [address] [facility] [address]  [place] [address]        Equal Access to Services     [facility]: Hadii aad ku hadasho Soomaali, waaxda luqadaha, qaybta kaalmada adeegyada, waxay idiin haysaurabhn estephanie escobar . So [facility] [phone].    ATENCIÓN: Si habla español, tiene a quiroz disposición servicios gratuitos de asistencia lingüística. Llame al [phone].    We comply with applicable federal civil rights laws and [place] laws. We do not discriminate on the basis of race, color, national origin, age, disability, sex, sexual orientation, or gender identity.            Thank you!     Thank you for choosing [facility] ORTHOPEDIC SURGERY  for your care. Our goal is always to provide you with excellent care. Hearing back from our patients is one way we can continue to improve our services. Please take a few minutes to complete the written survey that you may receive in the mail after your visit with us. Thank you!             Your Updated Medication List - Protect others around you: Learn how to safely use, store and throw away your medicines at  www.disposemymeds.org.          This list is accurate as of 4/19/18  3:58 PM.  Always use your most recent med list.                   Brand Name Dispense Instructions for use Diagnosis    albuterol 108 (90 Base) MCG/ACT Inhaler    VENTOLIN HFA    18 g    Inhale 2 puffs into the lungs every 4 hours as needed for shortness of breath / dyspnea or wheezing    Intermittent asthma, uncomplicated       IBUPROFEN PO           LORazepam 0.5 MG tablet    ATIVAN    40 tablet    Take 2 tablets (1 mg) by mouth every 8 hours as needed for anxiety    Generalized anxiety disorder       order for DME     1 Device    Equipment being ordered: sling    Acute pain of right shoulder

## 2018-04-19 NOTE — PATIENT INSTRUCTIONS
Advanced imaging is done by appointment. Some insurance require a prior authorization to be completed which may delay the time until you are able to schedule your appointment. You should be receiving a call from the scheduling department, if you have not heard from them in 24-48 hours.   Please call [facility] [place]: [phone] to schedule your right shoulder MRI.  Depending on your availability you can usually schedule within the next 1-2 days.    Please make a follow up appointment in the clinic at least 2 days following your MRI by calling [phone].

## 2018-04-21 ENCOUNTER — HOSPITAL ENCOUNTER (OUTPATIENT)
Dept: MRI IMAGING | Facility: CLINIC | Age: 43
Discharge: HOME | End: 2018-04-21
Attending: ORTHOPAEDIC SURGERY | Admitting: ORTHOPAEDIC SURGERY
Payer: COMMERCIAL

## 2018-04-21 DIAGNOSIS — M75.101 ROTATOR CUFF SYNDROME OF RIGHT SHOULDER: ICD-10-CM

## 2018-04-21 PROCEDURE — 73221 MRI JOINT UPR EXTREM W/O DYE: CPT | Mod: RT

## 2018-04-23 ENCOUNTER — OFFICE VISIT (OUTPATIENT)
Dept: ORTHOPEDICS | Facility: CLINIC | Age: 43
End: 2018-04-23
Payer: COMMERCIAL

## 2018-04-23 VITALS
WEIGHT: 219 LBS | HEIGHT: 72 IN | DIASTOLIC BLOOD PRESSURE: 82 MMHG | BODY MASS INDEX: 29.66 KG/M2 | SYSTOLIC BLOOD PRESSURE: 116 MMHG

## 2018-04-23 DIAGNOSIS — M75.101 ROTATOR CUFF SYNDROME OF RIGHT SHOULDER: Primary | ICD-10-CM

## 2018-04-23 PROCEDURE — 20610 DRAIN/INJ JOINT/BURSA W/O US: CPT | Mod: RT | Performed by: ORTHOPAEDIC SURGERY

## 2018-04-23 PROCEDURE — 99213 OFFICE O/P EST LOW 20 MIN: CPT | Mod: 25 | Performed by: ORTHOPAEDIC SURGERY

## 2018-04-23 NOTE — MR AVS SNAPSHOT
After Visit Summary   4/23/2018    Reinier Monge    MRN: [id number]           Patient Information     Date Of Birth          1975        Visit Information        Provider Department      4/23/2018 3:40 PM Gregorio Beltran MD [facility] ORTHOPEDIC SURGERY        Today's Diagnoses     Rotator cuff syndrome of right shoulder    -  1      Care Instructions    Plan:  Steroid injection of the right shoulder: subacromial space was performed today in clinic  Icing for the next 1-2 days may be helpful for pain. Injection may take 10-14 days to see the full effect.  Follow up: PRN              Follow-ups after your visit        Who to contact     If you have questions or need follow up information about today's clinic visit or your schedule please contact [facility] ORTHOPEDIC SURGERY directly at [phone].  Normal or non-critical lab and imaging results will be communicated to you by MyChart, letter or phone within 4 business days after the clinic has received the results. If you do not hear from us within 7 days, please contact the clinic through [employer]YM [employer] or phone. If you have a critical or abnormal lab result, we will notify you by phone as soon as possible.  Submit refill requests through [employer] or call your pharmacy and they will forward the refill request to us. Please allow 3 business days for your refill to be completed.          Additional Information About Your Visit        MyChart Information     [employer] gives you secure access to your electronic health record. If you see a primary care provider, you can also send messages to your care team and make appointments. If you have questions, please call your primary care clinic.  If you do not have a primary care provider, please call [phone] and they will assist you.        Care EveryWhere ID     This is your Care EveryWhere ID. This could be used by other organizations to access your [facility]  records  [id number]        Your Vitals Were     Height BMI (Body Mass Index)                6' (1.829 m) 29.7 kg/m2           Blood Pressure from Last 3 Encounters:   04/23/18 116/82   04/19/18 124/82   04/17/18 110/80    Weight from Last 3 Encounters:   04/23/18 219 lb (99.3 kg)   04/19/18 219 lb (99.3 kg)   04/17/18 219 lb 14.4 oz (99.7 kg)              We Performed the Following     DEXAMETHASONE SODIUM PHOS PER 1 MG     DRAIN/INJECT LARGE JOINT/BURSA     METHYLPREDNISOLONE 40 MG INJ          Today's Medication Changes          These changes are accurate as of 4/23/18 11:59 PM.  If you have any questions, ask your nurse or doctor.               Start taking these medicines.        Dose/Directions    dexamethasone 4 MG/ML injection   Commonly known as:  DECADRON   Used for:  Rotator cuff syndrome of right shoulder   Started by:  Gregorio Beltran MD        Dose:  4 mg   Inject 1 mL (4 mg) as directed once for 1 dose   Quantity:  1 mL   Refills:  0       methylPREDNISolone acetate 40 MG/ML injection   Commonly known as:  DEPO-MEDROL   Used for:  Rotator cuff syndrome of right shoulder   Started by:  Gregorio Beltran MD        Dose:  40 mg   1 mL (40 mg) by INTRA-ARTICULAR route once for 1 dose   Quantity:  1 mL   Refills:  0            Where to get your medicines      Some of these will need a paper prescription and others can be bought over the counter.  Ask your nurse if you have questions.     You don't need a prescription for these medications     dexamethasone 4 MG/ML injection    methylPREDNISolone acetate 40 MG/ML injection                Primary Care Provider Office Phone # Fax #    Charlie Thacker [phone]       [address] [facility] [address]  [facility] [address]        Equal Access to Services     [facility] : Aba Dominguez, pat rizo, cecile reeves. So [facility] [phone].    ATENCIÓN: Yessi domingo  español, tiene a quiroz disposición servicios gratuitos de asistencia lingüística. Trevon zheng [phone].    We comply with applicable federal civil rights laws and [place] laws. We do not discriminate on the basis of race, color, national origin, age, disability, sex, sexual orientation, or gender identity.            Thank you!     Thank you for choosing [facility] ORTHOPEDIC SURGERY  for your care. Our goal is always to provide you with excellent care. Hearing back from our patients is one way we can continue to improve our services. Please take a few minutes to complete the written survey that you may receive in the mail after your visit with us. Thank you!             Your Updated Medication List - Protect others around you: Learn how to safely use, store and throw away your medicines at www.disposemymeds.org.          This list is accurate as of 4/23/18 11:59 PM.  Always use your most recent med list.                   Brand Name Dispense Instructions for use Diagnosis    albuterol 108 (90 Base) MCG/ACT Inhaler    VENTOLIN HFA    18 g    Inhale 2 puffs into the lungs every 4 hours as needed for shortness of breath / dyspnea or wheezing    Intermittent asthma, uncomplicated       dexamethasone 4 MG/ML injection    DECADRON    1 mL    Inject 1 mL (4 mg) as directed once for 1 dose    Rotator cuff syndrome of right shoulder       IBUPROFEN PO           LORazepam 0.5 MG tablet    ATIVAN    40 tablet    Take 2 tablets (1 mg) by mouth every 8 hours as needed for anxiety    Generalized anxiety disorder       methylPREDNISolone acetate 40 MG/ML injection    DEPO-MEDROL    1 mL    1 mL (40 mg) by INTRA-ARTICULAR route once for 1 dose    Rotator cuff syndrome of right shoulder       order for DME     1 Device    Equipment being ordered: sling    Acute pain of right shoulder

## 2018-04-23 NOTE — PROGRESS NOTES
HISTORY OF PRESENT ILLNESS:    Reinier Monge is a 42 year old male who is seen in follow up for right shoulder MRI results review..  Present symptoms: increased range of motion since last visit, he is back to full range of motion with pain  Treatments tried to this point: none since last visit    PHYSICAL EXAM:  /82  Ht 6' (1.829 m)  Wt 219 lb (99.3 kg)  BMI 29.7 kg/m2  Body mass index is 29.7 kg/(m^2).   GENERAL APPEARANCE: healthy, alert and no distress   SKIN: no suspicious lesions or rashes  NEURO: Normal strength and tone, mentation intact and speech normal  VASCULAR:  good pulses, and cappillary refill   LYMPH: no lymphadenopathy   PSYCH:  mentation appears normal and affect normal/bright    MSK:  Examination of the neck and shoulder girdle musculature reveals no asymmetry, or atrophy to the muscle masses.  There is no erythema, ecchymosis or edema.  There is a normal lordosis to the cervical and lumbar spine regions.  There is a normal kyphosis to the thoracic spine.  There is no clinical evidence of scoliosis. There is no tenderness to palpation or percussion.  There is no paraspinal muscle spasm present.  There is a Normal range of motion to the cervical spine. Forward flexion to 45, extension to 55, R and L lateral bending to 45, and rotation to 70, both R and L.    Strength : Bicep 5/5   C5-6       Sensation :     Deltoid 5/5   C5    Lateral Arm    Wrist extensors 5/5  C6    Thumb    Tricep  5/5   C7    Middle Finger    Finger flexors  5/5  C8    Little Finger    Interossei  5/5   T1    Medial Arm    Reflexes :  Bicep   Symmetric  C5-6    BR Symmetric  C6    Tricep Symmetric  C7    Shoulder:  Examination of the right shoulder reveals a full active ROM and full passive ROM.    Forward Flexion : 180 degrees Pain  YES --   Abduction  :  180 degrees  YES --   Internal Rotation : thoracic 10  YES --  Subscap Lift-off test negative  External Rotation :    0 Deg-90  90 Deg- 90  Contralateral side  symmetric    There is no tenderness to palpation about the AC joint, anterior capsule or Bicep tendon.  There is mild tenderness to palpation in the subacromial space.  There is no rotator cuff weakness.  Speed's an Yergason's Tests for Bicep pathology are negative. Arm adduction does not cause pain in the AC joint.  Apprehension sign is negative. Scapular winging is not present. ROM of the elbow and wrist is normal and CMS is intact to fingertips.        IMAGING INTERPRETATION:    MRI RIGHT SHOULDER WITHOUT CONTRAST  4/21/2018 8:53 AM     HISTORY: Right shoulder pain for the past month after playing  volleyball. Assess rotator cuff.     COMPARISON: None.     TECHNIQUE: Multiplanar MR imaging was performed without contrast.     FINDINGS:     Osseous acromion outlet: There is a type II configuration of the  acromion with mild lateral and no significant anterior downsloping.  There are mild degenerative changes of the acromioclavicular joint.  The outlet is widely patent. No os acromiale.     Rotator cuff: There is moderate increased signal within and thickening  of the distal fibers of the supraspinatus tendon. This does not  involve the surface of the tendon and does not represent a tear. The  remaining rotator cuff structures are intact and unremarkable. No  fatty atrophy of the musculature is seen.      Labrum: No tear or other labral pathology is demonstrated.     Biceps tendon: The biceps tendon is in the bicipital groove. It is  intact and demonstrates normal signal.     Osseous structures and cartilaginous surfaces: No fracture or osseous  lesion is seen. There is mild resorptive change in the greater  tuberosity. No other abnormal marrow signal intensity is seen. The  cartilage surfaces are well preserved.     Additional findings: No joint effusion is demonstrated. There is no  fluid within the subacromial-subdeltoid bursa. The adjacent soft  tissues are unremarkable.         IMPRESSION:   1. Moderate  tendinosis of the supraspinatus. No rotator cuff tear is  seen.  2. Mild degenerative changes of the acromioclavicular joint.     YRN CROUCH MD       ASSESSMENT / PLAN: As above.  I offered him a corticosteroid injection into the subacromial space, which he accepted.    Procedure Note:  After risks and benefits were explained and questions answered, pt agreed to undergo a right subacromial shoulder injection. Using aseptic technique and a posterior approach, the subacromial space was infiltrated with 4 mg of Dexamethasone, 40 mg of Depo Medrol, and 3 cc of 1.0% Lidocaine.  There were no complications and the patient tolerated the procedure well.          Tarik Beltran MD  Dept. Orthopedic Surgery  [facility]

## 2018-04-25 NOTE — PATIENT INSTRUCTIONS
Plan:  Steroid injection of the right shoulder: subacromial space was performed today in clinic  Icing for the next 1-2 days may be helpful for pain. Injection may take 10-14 days to see the full effect.  Follow up: PRN

## 2018-05-17 RX ORDER — METHYLPREDNISOLONE ACETATE 40 MG/ML
40 INJECTION, SUSPENSION INTRA-ARTICULAR; INTRALESIONAL; INTRAMUSCULAR; SOFT TISSUE ONCE
Qty: 1 ML | Refills: 0 | OUTPATIENT
Start: 2018-05-17 | End: 2018-05-17

## 2018-05-17 RX ORDER — DEXAMETHASONE SODIUM PHOSPHATE 4 MG/ML
4 INJECTION, SOLUTION INTRA-ARTICULAR; INTRALESIONAL; INTRAMUSCULAR; INTRAVENOUS; SOFT TISSUE ONCE
Qty: 1 ML | Refills: 0 | OUTPATIENT
Start: 2018-05-17 | End: 2018-05-17

## 2018-05-29 DIAGNOSIS — F41.1 GENERALIZED ANXIETY DISORDER: ICD-10-CM

## 2018-05-29 RX ORDER — LORAZEPAM 0.5 MG/1
TABLET ORAL
Qty: 40 TABLET | Refills: 0 | Status: SHIPPED | OUTPATIENT
Start: 2018-05-29 | End: 2019-01-15

## 2018-05-29 NOTE — TELEPHONE ENCOUNTER
Last refill-11/2/17-#40    Last OV-4/17/18  w/Ava, 8/25/15 w/Kirstie    No CSA on file       Change PCP to [facility]?

## 2019-01-15 DIAGNOSIS — F41.1 GENERALIZED ANXIETY DISORDER: ICD-10-CM

## 2019-01-16 RX ORDER — LORAZEPAM 0.5 MG/1
TABLET ORAL
Qty: 40 TABLET | Refills: 0 | Status: SHIPPED | OUTPATIENT
Start: 2019-01-16 | End: 2019-07-24

## 2019-01-16 NOTE — TELEPHONE ENCOUNTER
Requested Prescriptions   Pending Prescriptions Disp Refills     LORazepam (ATIVAN) 0.5 MG tablet [Pharmacy Med Name: LORazepam Oral Tablet 0.5 MG] 40 tablet 0     Sig: TAKE 2 TABLETS BY MOUTH EVERY 8 HOURS AS NEEDED FOR ANXIETY    There is no refill protocol information for this order      Last Written Prescription Date:  05/29/2018  Last Fill Quantity: 40,  # refills: 0   Last office visit: 4/17/2018 with prescribing provider:     Future Office Visit:

## 2019-01-16 NOTE — TELEPHONE ENCOUNTER
Pt does not have signed CSA on file.   [place] reviewed:  reviewed- no concerns    [place]  [email or web address]     Routed to [facility] to review as patient has not seen Dr. Thacker since 2015.

## 2019-01-17 DIAGNOSIS — F41.1 GENERALIZED ANXIETY DISORDER: ICD-10-CM

## 2019-01-17 NOTE — TELEPHONE ENCOUNTER
Requested Prescriptions   Pending Prescriptions Disp Refills     LORazepam (ATIVAN) 0.5 MG tablet [Pharmacy Med Name: LORazepam Oral Tablet 0.5 MG] 40 tablet 0     Sig: TAKE 2 TABLETS BY MOUTH EVERY 8 HOURS AS NEEDED FOR ANXIETY    There is no refill protocol information for this order      Last Written Prescription Date:  01/16/2019  Last Fill Quantity: 40,  # refills: 0   Last office visit: 4/17/2018 with prescribing provider:     Future Office Visit:

## 2019-01-18 RX ORDER — LORAZEPAM 0.5 MG/1
TABLET ORAL
Qty: 40 TABLET | Refills: 0 | OUTPATIENT
Start: 2019-01-18

## 2019-01-22 DIAGNOSIS — F41.1 GENERALIZED ANXIETY DISORDER: ICD-10-CM

## 2019-01-23 RX ORDER — LORAZEPAM 0.5 MG/1
TABLET ORAL
Qty: 40 TABLET | Refills: 0 | OUTPATIENT
Start: 2019-01-23

## 2019-01-23 NOTE — TELEPHONE ENCOUNTER
Requested Prescriptions   Pending Prescriptions Disp Refills     LORazepam (ATIVAN) 0.5 MG tablet [Pharmacy Med Name: LORazepam Oral Tablet 0.5 MG] 40 tablet 0     Sig: TAKE 2 TABLETS BY MOUTH EVERY 8 HOURS AS NEEDED FOR ANXIETY    There is no refill protocol information for this order        LORazepam (ATIVAN) 0.5 MG tablet 40 tablet 0 1/16/2019       Last Written Prescription Date:  01/16/2019  Last Fill Quantity: 40,  # refills: 0   Last office visit: 4/17/2018 with prescribing provider:  Dr. Zelaya   Future Office Visit:  Unknown

## 2019-06-11 ENCOUNTER — ANCILLARY PROCEDURE (OUTPATIENT)
Dept: GENERAL RADIOLOGY | Facility: CLINIC | Age: 44
End: 2019-06-11
Attending: FAMILY MEDICINE
Payer: COMMERCIAL

## 2019-06-11 ENCOUNTER — OFFICE VISIT (OUTPATIENT)
Dept: INTERNAL MEDICINE | Facility: CLINIC | Age: 44
End: 2019-06-11
Payer: COMMERCIAL

## 2019-06-11 VITALS
DIASTOLIC BLOOD PRESSURE: 79 MMHG | BODY MASS INDEX: 28.19 KG/M2 | TEMPERATURE: 98.1 F | SYSTOLIC BLOOD PRESSURE: 118 MMHG | HEART RATE: 69 BPM | WEIGHT: 208.1 LBS | HEIGHT: 72 IN | RESPIRATION RATE: 18 BRPM | OXYGEN SATURATION: 98 %

## 2019-06-11 DIAGNOSIS — M25.532 LEFT WRIST PAIN: Primary | ICD-10-CM

## 2019-06-11 DIAGNOSIS — M25.532 LEFT WRIST PAIN: ICD-10-CM

## 2019-06-11 PROCEDURE — 73110 X-RAY EXAM OF WRIST: CPT | Mod: LT

## 2019-06-11 PROCEDURE — 99213 OFFICE O/P EST LOW 20 MIN: CPT | Performed by: FAMILY MEDICINE

## 2019-06-11 ASSESSMENT — MIFFLIN-ST. JEOR: SCORE: 1871.94

## 2019-06-11 NOTE — PROGRESS NOTES
CHIEF COMPLAINT    L wrist pain.      HISTORY    He has pain in ulnar aspect of L wrist after tagging out another player last night. Pain increases with supination. Not obviously swollen.      Patient Active Problem List   Diagnosis     Acute gastritis     Generalized anxiety disorder     Intermittent asthma     CARDIOVASCULAR SCREENING; LDL GOAL LESS THAN 160     Lateral epicondylitis of right elbow     Current Outpatient Medications   Medication Sig Dispense Refill     IBUPROFEN PO        LORazepam (ATIVAN) 0.5 MG tablet TAKE 2 TABLETS BY MOUTH EVERY 8 HOURS AS NEEDED FOR ANXIETY 40 tablet 0     VENTOLIN  (90 Base) MCG/ACT inhaler INHALE 2 PUFFS INTO THE LUNGS EVERY 4 HOURS AS NEEDED FOR SHORTNESS OF BREATH / DYSPNEA OR WHEEZING  18 g 0     dexamethasone (DECADRON) 4 MG/ML injection Inject 1 mL (4 mg) as directed once for 1 dose 1 mL 0       REVIEW OF SYSTEMS    Unremarkable except as above.      Past Medical History:   Diagnosis Date     Mild intermittent asthma     Rare need for albuterol inhaler, mostly in sprin       EXAM  /79 (BP Location: Right arm, Patient Position: Sitting, Cuff Size: Adult Large)   Pulse 69   Temp 98.1  F (36.7  C) (Oral)   Resp 18   Ht 1.829 m (6')   Wt 94.4 kg (208 lb 1.6 oz)   SpO2 98%   BMI 28.22 kg/m      L wrist:  Flexion and extension normal.  Not especially tender to palpation.      (M25.532) Left wrist pain  (primary encounter diagnosis)  Comment:     This appears to be a soft tissue injury such as a tendon or ligamentous strain.  There would be also a chance of a bone bruise.  No fracture or likely.    Plan: XR Wrist Left G/E 3 Views, ORTHOPEDICS ADULT         REFERRAL          Condition discussed.  He does have a wrist brace and certainly it is okay for him to wear this.  Ice and ibuprofen were also advised.  If he has worsening or persistent pain he should return for follow-up.  Otherwise he can gradually increase activity.    Patient voices understanding  of recommendations.

## 2019-09-29 ENCOUNTER — HEALTH MAINTENANCE LETTER (OUTPATIENT)
Age: 44
End: 2019-09-29

## 2021-01-14 ENCOUNTER — HEALTH MAINTENANCE LETTER (OUTPATIENT)
Age: 46
End: 2021-01-14

## 2021-10-24 ENCOUNTER — HEALTH MAINTENANCE LETTER (OUTPATIENT)
Age: 46
End: 2021-10-24

## 2022-02-13 ENCOUNTER — HEALTH MAINTENANCE LETTER (OUTPATIENT)
Age: 47
End: 2022-02-13

## 2022-10-15 ENCOUNTER — HEALTH MAINTENANCE LETTER (OUTPATIENT)
Age: 47
End: 2022-10-15

## 2023-03-26 ENCOUNTER — HEALTH MAINTENANCE LETTER (OUTPATIENT)
Age: 48
End: 2023-03-26

## 2023-06-27 NOTE — NURSING NOTE
Chief Complaint   Patient presents with     Musculoskeletal Problem     left side into back  x 1 month  lower back  x 1 year       Initial /76 (BP Location: Right arm, Cuff Size: Adult Large)  Pulse 73  Temp 98  F (36.7  C) (Oral)  Ht 6' (1.829 m)  Wt 209 lb 6.4 oz (95 kg)  BMI 28.4 kg/m2 Estimated body mass index is 28.4 kg/(m^2) as calculated from the following:    Height as of this encounter: 6' (1.829 m).    Weight as of this encounter: 209 lb 6.4 oz (95 kg).  Medication Reconciliation: complete   Francisca Morris MA    
[Negative] : Genitourinary